# Patient Record
Sex: FEMALE | Race: WHITE | NOT HISPANIC OR LATINO | Employment: OTHER | URBAN - METROPOLITAN AREA
[De-identification: names, ages, dates, MRNs, and addresses within clinical notes are randomized per-mention and may not be internally consistent; named-entity substitution may affect disease eponyms.]

---

## 2022-10-06 ENCOUNTER — OFFICE VISIT (OUTPATIENT)
Dept: FAMILY MEDICINE | Facility: CLINIC | Age: 67
End: 2022-10-06
Payer: MEDICARE

## 2022-10-06 VITALS
HEART RATE: 87 BPM | DIASTOLIC BLOOD PRESSURE: 78 MMHG | BODY MASS INDEX: 18.18 KG/M2 | WEIGHT: 102.63 LBS | TEMPERATURE: 99 F | RESPIRATION RATE: 20 BRPM | HEIGHT: 63 IN | OXYGEN SATURATION: 97 % | SYSTOLIC BLOOD PRESSURE: 130 MMHG

## 2022-10-06 DIAGNOSIS — Z79.899 ON LONG TERM DRUG THERAPY: ICD-10-CM

## 2022-10-06 DIAGNOSIS — F17.200 TOBACCO DEPENDENCE: ICD-10-CM

## 2022-10-06 DIAGNOSIS — R60.9 EDEMA, UNSPECIFIED TYPE: ICD-10-CM

## 2022-10-06 DIAGNOSIS — J44.9 CHRONIC OBSTRUCTIVE PULMONARY DISEASE, UNSPECIFIED COPD TYPE: Primary | ICD-10-CM

## 2022-10-06 DIAGNOSIS — Z12.39 ENCOUNTER FOR SCREENING FOR MALIGNANT NEOPLASM OF BREAST, UNSPECIFIED SCREENING MODALITY: ICD-10-CM

## 2022-10-06 DIAGNOSIS — R06.02 SOB (SHORTNESS OF BREATH): ICD-10-CM

## 2022-10-06 DIAGNOSIS — I73.9 CLAUDICATION: ICD-10-CM

## 2022-10-06 DIAGNOSIS — Z87.891 PERSONAL HISTORY OF NICOTINE DEPENDENCE: ICD-10-CM

## 2022-10-06 DIAGNOSIS — I10 HYPERTENSION, UNSPECIFIED TYPE: ICD-10-CM

## 2022-10-06 DIAGNOSIS — Z12.31 ENCOUNTER FOR SCREENING MAMMOGRAM FOR MALIGNANT NEOPLASM OF BREAST: ICD-10-CM

## 2022-10-06 DIAGNOSIS — D69.1 THROMBOCYTE DISORDER: ICD-10-CM

## 2022-10-06 DIAGNOSIS — Z12.11 SCREENING FOR MALIGNANT NEOPLASM OF COLON: ICD-10-CM

## 2022-10-06 DIAGNOSIS — I71.40 ABDOMINAL AORTIC ANEURYSM (AAA) WITHOUT RUPTURE, UNSPECIFIED PART: ICD-10-CM

## 2022-10-06 DIAGNOSIS — M47.816 LUMBAR ARTHROPATHY: ICD-10-CM

## 2022-10-06 DIAGNOSIS — M54.12 CERVICAL RADICULOPATHY: ICD-10-CM

## 2022-10-06 LAB
ALBUMIN SERPL BCP-MCNC: 3.7 G/DL (ref 3.4–5)
ALBUMIN/GLOBULIN RATIO: 1.16 RATIO (ref 1.1–1.8)
ALP SERPL-CCNC: 105 U/L (ref 46–116)
ALT SERPL W P-5'-P-CCNC: 31 U/L (ref 12–78)
ANION GAP SERPL CALC-SCNC: 6 MMOL/L (ref 3–11)
AST SERPL-CCNC: 26 U/L (ref 15–37)
BANDS: 2 % (ref 0–5)
BILIRUB SERPL-MCNC: 0.2 MG/DL (ref 0–1)
BNP SERPL-MCNC: 82 PG/ML (ref 0–100)
BUN SERPL-MCNC: 18 MG/DL (ref 7–18)
BUN/CREAT SERPL: 22.78 RATIO (ref 7–18)
CALCIUM SERPL-MCNC: 8.4 MG/DL (ref 8.8–10.5)
CELLS COUNTED: 100
CHLORIDE SERPL-SCNC: 104 MMOL/L (ref 100–108)
CHOLEST SERPL-MSCNC: 194 MG/DL
CO2 SERPL-SCNC: 33 MMOL/L (ref 21–32)
CREAT SERPL-MCNC: 0.79 MG/DL (ref 0.55–1.02)
EOSINOPHIL NFR BLD: 5 % (ref 1–3)
ERYTHROCYTE [DISTWIDTH] IN BLOOD BY AUTOMATED COUNT: 17 % (ref 12.5–18)
GFR ESTIMATION: > 60
GLOBULIN: 3.2 G/DL (ref 2.3–3.5)
GLUCOSE SERPL-MCNC: 87 MG/DL (ref 70–110)
HBA1C MFR BLD: 5.2 % (ref 4.5–6.2)
HCT VFR BLD AUTO: 40.2 % (ref 37–47)
HDL/CHOLESTEROL RATIO: 4.1 RATIO
HDLC SERPL-MCNC: 47 MG/DL (ref 39–96)
HGB BLD-MCNC: 13.3 G/DL (ref 12–16)
LDLC SERPL CALC-MCNC: 76.6 MG/DL
LYMPHOCYTES NFR BLD: 14 % (ref 25–40)
MCH RBC QN AUTO: 33.1 PG (ref 27–31.2)
MCHC RBC AUTO-ENTMCNC: 33.1 G/DL (ref 31.8–35.4)
MCV RBC AUTO: 100 FL (ref 80–97)
MONOCYTES NFR BLD: 5 % (ref 1–15)
NEUTROPHILS # BLD AUTO: 7.5 10*3/UL (ref 1.8–7.7)
NEUTROPHILS NFR BLD: 74 % (ref 37–80)
NUCLEATED RED BLOOD CELLS: 0 %
PLATELETS: 327 10*3/UL (ref 142–424)
POTASSIUM SERPL-SCNC: 4.5 MMOL/L (ref 3.6–5.2)
PROT SERPL-MCNC: 6.9 G/DL (ref 6.4–8.2)
RBC # BLD AUTO: 4.02 10*6/UL (ref 4.2–5.4)
RBC MORPH BLD: ABNORMAL
SMALL PLATELETS BLD QL SMEAR: ADEQUATE
SODIUM BLD-SCNC: 143 MMOL/L (ref 135–145)
T4 FREE SP9 P CHAL SERPL-SCNC: 0.86 NG/DL (ref 0.76–1.46)
TRIGL SERPL-MCNC: 352 MG/DL (ref 30–200)
TSH SERPL DL<=0.005 MIU/L-ACNC: 0.87 UIU/ML (ref 0.36–3.74)
VLDL CHOLESTEROL: 70 MG/DL (ref 0–40)
WBC # BLD: 9.9 10*3/UL (ref 4.6–10.2)

## 2022-10-06 PROCEDURE — 99204 PR OFFICE/OUTPT VISIT, NEW, LEVL IV, 45-59 MIN: ICD-10-PCS | Mod: S$GLB,,, | Performed by: FAMILY MEDICINE

## 2022-10-06 PROCEDURE — 99204 OFFICE O/P NEW MOD 45 MIN: CPT | Mod: S$GLB,,, | Performed by: FAMILY MEDICINE

## 2022-10-06 RX ORDER — HYDROCHLOROTHIAZIDE 25 MG/1
25 TABLET ORAL DAILY
Qty: 30 TABLET | Refills: 11 | OUTPATIENT
Start: 2022-10-06 | End: 2022-10-06

## 2022-10-06 RX ORDER — HYDROCODONE BITARTRATE AND ACETAMINOPHEN 7.5; 325 MG/1; MG/1
1 TABLET ORAL EVERY 8 HOURS PRN
Qty: 20 TABLET | Refills: 0 | Status: SHIPPED | OUTPATIENT
Start: 2022-10-06

## 2022-10-06 RX ORDER — HYDROCHLOROTHIAZIDE 25 MG/1
25 TABLET ORAL DAILY
Qty: 30 TABLET | Refills: 11 | Status: SHIPPED | OUTPATIENT
Start: 2022-10-06 | End: 2022-11-07 | Stop reason: SDUPTHER

## 2022-10-06 RX ORDER — ALBUTEROL SULFATE 90 UG/1
2 AEROSOL, METERED RESPIRATORY (INHALATION) EVERY 6 HOURS PRN
Qty: 18 G | Refills: 2 | Status: SHIPPED | OUTPATIENT
Start: 2022-10-06 | End: 2023-01-18 | Stop reason: SDUPTHER

## 2022-10-06 RX ORDER — POTASSIUM CHLORIDE 750 MG/1
10 TABLET, EXTENDED RELEASE ORAL DAILY
Qty: 30 TABLET | Refills: 1 | Status: SHIPPED | OUTPATIENT
Start: 2022-10-06

## 2022-10-06 NOTE — PROGRESS NOTES
Subjective:      Patient ID: Daniela Ramires is a 67 y.o. female.    Chief Complaint: Establish Care and Leg Swelling (Pt. Reports she has large amount of swelling primarily in lower legs)      HPI:  67-year-old female who presents for initiation of care.  She does complain of some lower extremity swelling. she reports this began a couple months ago.  She does get pain in her lower extremities when she ambulates.  She attributes this to her back pain.  She has had multiple surgeries.  She would like some pain pills and so she can get her surgery.  She does take gabapentin.  She takes amlodipine for her blood pressure.  She does report she was told she had a AAA in the past.  Was noted on a routine CT scan.  She did have some platelets disorders in the past.  She thinks this resolved.  He had lost some weight for little while but she is gaining weight back.  She still smokes 1 pack per day.  She is due for mammogram.  She has never had a colonoscopy.    Past Medical History:   Diagnosis Date    COPD (chronic obstructive pulmonary disease)     Hypertension      Past Surgical History:   Procedure Laterality Date    BACK SURGERY       SECTION      HYSTERECTOMY      NECK SURGERY       History reviewed. No pertinent family history.  Social History     Socioeconomic History    Marital status:    Tobacco Use    Smoking status: Every Day     Packs/day: 1.00     Types: Cigarettes     Passive exposure: Past    Smokeless tobacco: Never   Substance and Sexual Activity    Alcohol use: Never     Review of patient's allergies indicates:  No Known Allergies    Review of Systems   Constitutional:  Negative for activity change, appetite change, chills, fatigue and fever.   HENT:  Negative for congestion, ear pain, postnasal drip, rhinorrhea, sinus pressure, sinus pain and sore throat.    Eyes:  Negative for pain and redness.   Respiratory:  Positive for shortness of breath. Negative for cough and chest tightness.   "  Cardiovascular:  Positive for leg swelling. Negative for chest pain.   Gastrointestinal:  Negative for abdominal distention, abdominal pain, constipation, diarrhea, nausea and vomiting.   Endocrine: Negative for cold intolerance and heat intolerance.   Genitourinary:  Negative for dysuria, frequency and hematuria.   Musculoskeletal:  Positive for arthralgias, back pain and neck pain. Negative for joint swelling.   Skin:  Negative for pallor.   Neurological:  Negative for dizziness and light-headedness.   Psychiatric/Behavioral:  Negative for agitation, decreased concentration and hallucinations. The patient is not nervous/anxious.      Objective:       /78   Pulse 87   Temp 98.7 °F (37.1 °C) (Oral)   Resp 20   Ht 5' 3" (1.6 m)   Wt 46.5 kg (102 lb 9.6 oz)   SpO2 97%   BMI 18.17 kg/m²   Physical Exam  Vitals and nursing note reviewed.   Constitutional:       Appearance: She is well-developed.   HENT:      Head: Normocephalic and atraumatic.      Nose: Nose normal.   Eyes:      Conjunctiva/sclera: Conjunctivae normal.      Pupils: Pupils are equal, round, and reactive to light.   Cardiovascular:      Rate and Rhythm: Normal rate and regular rhythm.      Heart sounds: Normal heart sounds.   Pulmonary:      Effort: Pulmonary effort is normal.      Breath sounds: Normal breath sounds.   Abdominal:      Palpations: Abdomen is soft.      Tenderness: There is no abdominal tenderness.   Musculoskeletal:         General: Normal range of motion.      Cervical back: Normal range of motion and neck supple.      Right lower leg: Edema present.      Left lower leg: Edema present.   Skin:     General: Skin is warm and dry.   Neurological:      Mental Status: She is alert and oriented to person, place, and time.   Psychiatric:         Behavior: Behavior normal.         Thought Content: Thought content normal.         Judgment: Judgment normal.       Assessment:     1. Chronic obstructive pulmonary disease, unspecified " COPD type    2. SOB (shortness of breath)    3. Hypertension, unspecified type    4. Edema, unspecified type    5. On long term drug therapy    6. Lumbar arthropathy    7. Cervical radiculopathy    8. Encounter for screening for malignant neoplasm of breast, unspecified screening modality    9. Tobacco dependence    10. Screening for malignant neoplasm of colon    11. Claudication    12. Abdominal aortic aneurysm (AAA) without rupture, unspecified part    13. Personal history of nicotine dependence    14. Encounter for screening mammogram for malignant neoplasm of breast    15. Thrombocyte disorder        Plan:   Chronic obstructive pulmonary disease, unspecified COPD type  -     albuterol (VENTOLIN HFA) 90 mcg/actuation inhaler; Inhale 2 puffs into the lungs every 6 (six) hours as needed for Wheezing. Rescue  Dispense: 18 g; Refill: 2    SOB (shortness of breath)  -     X-Ray Chest PA And Lateral; Future; Expected date: 10/06/2022    Hypertension, unspecified type  -     Discontinue: hydroCHLOROthiazide (HYDRODIURIL) 25 MG tablet; Take 1 tablet (25 mg total) by mouth once daily.  Dispense: 30 tablet; Refill: 11  -     hydroCHLOROthiazide (HYDRODIURIL) 25 MG tablet; Take 1 tablet (25 mg total) by mouth once daily.  Dispense: 30 tablet; Refill: 11  -     potassium chloride (KLOR-CON) 10 MEQ TbSR; Take 1 tablet (10 mEq total) by mouth once daily.  Dispense: 30 tablet; Refill: 1    Edema, unspecified type  -     Brain natriuretic peptide; Future; Expected date: 10/06/2022    On long term drug therapy  -     Hemoglobin A1C; Future; Expected date: 10/06/2022  -     CBC Auto Differential; Future; Expected date: 10/06/2022  -     TSH; Future; Expected date: 10/06/2022  -     T4, Free; Future; Expected date: 10/06/2022  -     Comprehensive Metabolic Panel; Future; Expected date: 10/06/2022  -     Lipid Panel; Future; Expected date: 10/06/2022    Lumbar arthropathy  -     HYDROcodone-acetaminophen (NORCO) 7.5-325 mg per  tablet; Take 1 tablet by mouth every 8 (eight) hours as needed for Pain.  Dispense: 20 tablet; Refill: 0    Cervical radiculopathy    Encounter for screening for malignant neoplasm of breast, unspecified screening modality  -     Mammo Digital Screening Bilat; Future; Expected date: 10/06/2022    Tobacco dependence  -     CT Chest Lung Screening Low Dose; Future; Expected date: 10/06/2022    Screening for malignant neoplasm of colon  -     Ambulatory referral/consult to General Surgery; Future; Expected date: 10/13/2022    Claudication  -     US Lower Extrem Arteries Bilat with KIERRA (xpd); Future; Expected date: 10/06/2022    Abdominal aortic aneurysm (AAA) without rupture, unspecified part    Personal history of nicotine dependence  -     CT Chest Lung Screening Low Dose; Future; Expected date: 10/06/2022    Encounter for screening mammogram for malignant neoplasm of breast  -     Mammo Digital Screening Bilat; Future; Expected date: 10/06/2022    Thrombocyte disorder  -     CBC Auto Differential; Future; Expected date: 10/06/2022      Labs pending.      Order CT lung.      Sample trelegy provided.  One puff daily.  Let me know next week or the week after feel like it.  Refill Ventolin.      Smoking cessation encouraged.      Order mammogram.      Order KIERRA.      Refer for colonoscopy.      Request old records.      Will likely need a repeat ultrasound for her AA A.      Follow-up in 1 month.  Sooner if needed.      Gabapentin may be the cause of her lower extremity swelling.  Will continue for now.      Stop amlodipine.  This could be the cause of her lower extremity swelling.      Start hydrochlorothiazide.  This will assist with her blood pressures well as her lower extremity swelling.          Medication List with Changes/Refills   New Medications    ALBUTEROL (VENTOLIN HFA) 90 MCG/ACTUATION INHALER    Inhale 2 puffs into the lungs every 6 (six) hours as needed for Wheezing. Rescue    HYDROCHLOROTHIAZIDE  (HYDRODIURIL) 25 MG TABLET    Take 1 tablet (25 mg total) by mouth once daily.    HYDROCODONE-ACETAMINOPHEN (NORCO) 7.5-325 MG PER TABLET    Take 1 tablet by mouth every 8 (eight) hours as needed for Pain.    POTASSIUM CHLORIDE (KLOR-CON) 10 MEQ TBSR    Take 1 tablet (10 mEq total) by mouth once daily.            Disclaimer: This note may have been prepared using voice recognition software, it may have not been extensively proofed, as such there could be errors within the text such as sound alike errors.

## 2022-10-07 ENCOUNTER — PATIENT MESSAGE (OUTPATIENT)
Dept: ADMINISTRATIVE | Facility: HOSPITAL | Age: 67
End: 2022-10-07
Payer: MEDICARE

## 2022-10-12 DIAGNOSIS — F17.200 TOBACCO DEPENDENCE: Primary | ICD-10-CM

## 2022-10-12 DIAGNOSIS — F17.210 CIGARETTE SMOKER: ICD-10-CM

## 2022-10-17 DIAGNOSIS — M47.816 LUMBAR ARTHROPATHY: ICD-10-CM

## 2022-10-17 RX ORDER — HYDROCODONE BITARTRATE AND ACETAMINOPHEN 7.5; 325 MG/1; MG/1
1 TABLET ORAL EVERY 8 HOURS PRN
Qty: 20 TABLET | Refills: 0 | OUTPATIENT
Start: 2022-10-17

## 2022-10-17 NOTE — TELEPHONE ENCOUNTER
----- Message from Rika Macias sent at 10/17/2022  1:48 PM CDT -----  Contact: pt  Pt calling for refill on HYDROcodone-acetaminophen (NORCO) 7.5-325 mg Pt can be reached at [phone]       [facility] [address]  Phone: [phone] Fax: [phone]    Thanks,

## 2022-11-07 ENCOUNTER — OFFICE VISIT (OUTPATIENT)
Dept: FAMILY MEDICINE | Facility: CLINIC | Age: 67
End: 2022-11-07
Payer: MEDICARE

## 2022-11-07 VITALS
BODY MASS INDEX: 18.18 KG/M2 | HEART RATE: 104 BPM | WEIGHT: 102.63 LBS | OXYGEN SATURATION: 93 % | DIASTOLIC BLOOD PRESSURE: 68 MMHG | SYSTOLIC BLOOD PRESSURE: 120 MMHG | HEIGHT: 63 IN | RESPIRATION RATE: 18 BRPM

## 2022-11-07 DIAGNOSIS — F41.9 ANXIETY: ICD-10-CM

## 2022-11-07 DIAGNOSIS — F17.200 TOBACCO DEPENDENCE: ICD-10-CM

## 2022-11-07 DIAGNOSIS — M47.816 LUMBAR ARTHROPATHY: ICD-10-CM

## 2022-11-07 DIAGNOSIS — I10 HYPERTENSION, UNSPECIFIED TYPE: ICD-10-CM

## 2022-11-07 DIAGNOSIS — J44.9 CHRONIC OBSTRUCTIVE PULMONARY DISEASE, UNSPECIFIED COPD TYPE: Primary | ICD-10-CM

## 2022-11-07 DIAGNOSIS — Z90.81 HISTORY OF SPLENECTOMY: ICD-10-CM

## 2022-11-07 PROCEDURE — 99214 PR OFFICE/OUTPT VISIT, EST, LEVL IV, 30-39 MIN: ICD-10-PCS | Mod: S$GLB,,, | Performed by: FAMILY MEDICINE

## 2022-11-07 PROCEDURE — 99214 OFFICE O/P EST MOD 30 MIN: CPT | Mod: S$GLB,,, | Performed by: FAMILY MEDICINE

## 2022-11-07 RX ORDER — HYDROCHLOROTHIAZIDE 25 MG/1
25 TABLET ORAL DAILY
Qty: 30 TABLET | Refills: 3 | Status: SHIPPED | OUTPATIENT
Start: 2022-11-07 | End: 2023-06-12 | Stop reason: SDUPTHER

## 2022-11-07 RX ORDER — ALPRAZOLAM 0.25 MG/1
0.25 TABLET ORAL DAILY PRN
Qty: 30 TABLET | Refills: 3 | Status: SHIPPED | OUTPATIENT
Start: 2022-11-07 | End: 2023-03-10

## 2022-11-07 RX ORDER — BUPROPION HYDROCHLORIDE 150 MG/1
150 TABLET ORAL DAILY
Qty: 30 TABLET | Refills: 11 | Status: SHIPPED | OUTPATIENT
Start: 2022-11-07 | End: 2023-07-10 | Stop reason: SDUPTHER

## 2022-11-07 NOTE — PROGRESS NOTES
Subjective:      Patient ID: Daniela Ramires is a 67 y.o. female.    Chief Complaint: Follow-up and Anxiety (Trying to stop smoking but anxiety is up, has taken wellbutrin in the past and it worked good)      HPI:  67-year-old female who presents for chronic medical management.  Swelling better since stopping amlodipine and starting hydrochlorothiazide.  She is nervous about her upcoming back surgery.  She is trying to quit smoking.  She took Wellbutrin in the past and help.  She is napping on her children and grandchildren.  She took Xanax in the past and help with the irritability associated with smoking cessation.  She was happy with the Trelegy.  Did seem to help with her shortness of breath.  Was requiring less ventolin    Past Medical History:   Diagnosis Date    COPD (chronic obstructive pulmonary disease)     Hypertension      Past Surgical History:   Procedure Laterality Date    BACK SURGERY       SECTION      HYSTERECTOMY      NECK SURGERY       History reviewed. No pertinent family history.  Social History     Socioeconomic History    Marital status:    Tobacco Use    Smoking status: Every Day     Packs/day: 1.00     Types: Cigarettes     Passive exposure: Past    Smokeless tobacco: Never   Substance and Sexual Activity    Alcohol use: Never     Review of patient's allergies indicates:  No Known Allergies    Review of Systems   Constitutional:  Negative for activity change, appetite change, chills, fatigue and fever.   HENT:  Negative for congestion, ear pain, postnasal drip, rhinorrhea, sinus pressure, sinus pain and sore throat.    Eyes:  Negative for pain and redness.   Respiratory:  Negative for cough, chest tightness and shortness of breath.    Cardiovascular:  Negative for chest pain and leg swelling.   Gastrointestinal:  Negative for abdominal distention, abdominal pain, constipation, diarrhea, nausea and vomiting.   Endocrine: Negative for cold intolerance and heat intolerance.  "  Genitourinary:  Negative for dysuria, frequency and hematuria.   Musculoskeletal:  Positive for back pain. Negative for arthralgias and joint swelling.   Skin:  Negative for pallor.   Neurological:  Negative for dizziness and light-headedness.   Psychiatric/Behavioral:  Negative for agitation, decreased concentration and hallucinations. The patient is nervous/anxious.      Objective:       /68 (BP Location: Left arm, Patient Position: Sitting, BP Method: Medium (Manual))   Pulse 104   Resp 18   Ht 5' 3" (1.6 m)   Wt 46.5 kg (102 lb 9.6 oz)   SpO2 (!) 93%   BMI 18.17 kg/m²   Physical Exam  Constitutional:       Appearance: She is well-developed.   HENT:      Head: Normocephalic and atraumatic.      Nose: Nose normal.   Eyes:      Conjunctiva/sclera: Conjunctivae normal.      Pupils: Pupils are equal, round, and reactive to light.   Cardiovascular:      Rate and Rhythm: Normal rate and regular rhythm.      Heart sounds: Normal heart sounds.   Pulmonary:      Effort: Pulmonary effort is normal.      Breath sounds: Normal breath sounds.   Abdominal:      Palpations: Abdomen is soft.   Musculoskeletal:         General: Normal range of motion.      Cervical back: Normal range of motion and neck supple.   Skin:     General: Skin is warm and dry.   Neurological:      Mental Status: She is alert and oriented to person, place, and time.   Psychiatric:         Behavior: Behavior normal.         Thought Content: Thought content normal.       Assessment:     1. Chronic obstructive pulmonary disease, unspecified COPD type    2. Tobacco dependence    3. Anxiety    4. Hypertension, unspecified type    5. History of splenectomy    6. Lumbar arthropathy        Plan:   Chronic obstructive pulmonary disease, unspecified COPD type  -     fluticasone-umeclidin-vilanter (TRELEGY ELLIPTA) 100-62.5-25 mcg DsDv; Inhale 1 puff into the lungs once daily.  Dispense: 60 each; Refill: 3    Tobacco dependence  -     buPROPion " (WELLBUTRIN XL) 150 MG TB24 tablet; Take 1 tablet (150 mg total) by mouth once daily.  Dispense: 30 tablet; Refill: 11    Anxiety  -     ALPRAZolam (XANAX) 0.25 MG tablet; Take 1 tablet (0.25 mg total) by mouth daily as needed for Anxiety.  Dispense: 30 tablet; Refill: 3    Hypertension, unspecified type  -     hydroCHLOROthiazide (HYDRODIURIL) 25 MG tablet; Take 1 tablet (25 mg total) by mouth once daily.  Dispense: 30 tablet; Refill: 3    History of splenectomy    Lumbar arthropathy      Sample Trelegy provided.  Continue.      Trial of Wellbutrin.      [place]  reviewed.  Trial of low-dose Xanax.      Continue hydrochlorothiazide.      Follow-up in 3 months.  Sooner if needed    Medication List with Changes/Refills   New Medications    ALPRAZOLAM (XANAX) 0.25 MG TABLET    Take 1 tablet (0.25 mg total) by mouth daily as needed for Anxiety.    BUPROPION (WELLBUTRIN XL) 150 MG TB24 TABLET    Take 1 tablet (150 mg total) by mouth once daily.    FLUTICASONE-UMECLIDIN-VILANTER (TRELEGY ELLIPTA) 100-62.5-25 MCG DSDV    Inhale 1 puff into the lungs once daily.   Current Medications    ALBUTEROL (VENTOLIN HFA) 90 MCG/ACTUATION INHALER    Inhale 2 puffs into the lungs every 6 (six) hours as needed for Wheezing. Rescue    HYDROCODONE-ACETAMINOPHEN (NORCO) 7.5-325 MG PER TABLET    Take 1 tablet by mouth every 8 (eight) hours as needed for Pain.    POTASSIUM CHLORIDE (KLOR-CON) 10 MEQ TBSR    Take 1 tablet (10 mEq total) by mouth once daily.   Changed and/or Refilled Medications    Modified Medication Previous Medication    HYDROCHLOROTHIAZIDE (HYDRODIURIL) 25 MG TABLET hydroCHLOROthiazide (HYDRODIURIL) 25 MG tablet       Take 1 tablet (25 mg total) by mouth once daily.    Take 1 tablet (25 mg total) by mouth once daily.            Disclaimer: This note may have been prepared using voice recognition software, it may have not been extensively proofed, as such there could be errors within the text such as sound alike errors.

## 2022-11-16 ENCOUNTER — PATIENT MESSAGE (OUTPATIENT)
Dept: ADMINISTRATIVE | Facility: HOSPITAL | Age: 67
End: 2022-11-16
Payer: MEDICARE

## 2022-12-22 ENCOUNTER — OFFICE VISIT (OUTPATIENT)
Dept: FAMILY MEDICINE | Facility: CLINIC | Age: 67
End: 2022-12-22
Payer: MEDICARE

## 2022-12-22 VITALS
HEIGHT: 63 IN | BODY MASS INDEX: 19.2 KG/M2 | OXYGEN SATURATION: 96 % | DIASTOLIC BLOOD PRESSURE: 70 MMHG | TEMPERATURE: 98 F | SYSTOLIC BLOOD PRESSURE: 110 MMHG | RESPIRATION RATE: 20 BRPM | WEIGHT: 108.38 LBS | HEART RATE: 83 BPM

## 2022-12-22 DIAGNOSIS — M47.816 LUMBAR ARTHROPATHY: ICD-10-CM

## 2022-12-22 DIAGNOSIS — J44.9 CHRONIC OBSTRUCTIVE PULMONARY DISEASE, UNSPECIFIED COPD TYPE: Primary | ICD-10-CM

## 2022-12-22 DIAGNOSIS — I10 HYPERTENSION, UNSPECIFIED TYPE: ICD-10-CM

## 2022-12-22 DIAGNOSIS — M54.12 CERVICAL RADICULOPATHY: ICD-10-CM

## 2022-12-22 DIAGNOSIS — D69.1 THROMBOCYTE DISORDER: ICD-10-CM

## 2022-12-22 PROCEDURE — 3008F PR BODY MASS INDEX (BMI) DOCUMENTED: ICD-10-PCS | Mod: CPTII,S$GLB,, | Performed by: FAMILY MEDICINE

## 2022-12-22 PROCEDURE — 99214 OFFICE O/P EST MOD 30 MIN: CPT | Mod: S$GLB,,, | Performed by: FAMILY MEDICINE

## 2022-12-22 PROCEDURE — 1159F MED LIST DOCD IN RCRD: CPT | Mod: CPTII,S$GLB,, | Performed by: FAMILY MEDICINE

## 2022-12-22 PROCEDURE — 3044F HG A1C LEVEL LT 7.0%: CPT | Mod: CPTII,S$GLB,, | Performed by: FAMILY MEDICINE

## 2022-12-22 PROCEDURE — 99214 PR OFFICE/OUTPT VISIT, EST, LEVL IV, 30-39 MIN: ICD-10-PCS | Mod: S$GLB,,, | Performed by: FAMILY MEDICINE

## 2022-12-22 PROCEDURE — 3078F PR MOST RECENT DIASTOLIC BLOOD PRESSURE < 80 MM HG: ICD-10-PCS | Mod: CPTII,S$GLB,, | Performed by: FAMILY MEDICINE

## 2022-12-22 PROCEDURE — 3044F PR MOST RECENT HEMOGLOBIN A1C LEVEL <7.0%: ICD-10-PCS | Mod: CPTII,S$GLB,, | Performed by: FAMILY MEDICINE

## 2022-12-22 PROCEDURE — 3078F DIAST BP <80 MM HG: CPT | Mod: CPTII,S$GLB,, | Performed by: FAMILY MEDICINE

## 2022-12-22 PROCEDURE — 3074F PR MOST RECENT SYSTOLIC BLOOD PRESSURE < 130 MM HG: ICD-10-PCS | Mod: CPTII,S$GLB,, | Performed by: FAMILY MEDICINE

## 2022-12-22 PROCEDURE — 1159F PR MEDICATION LIST DOCUMENTED IN MEDICAL RECORD: ICD-10-PCS | Mod: CPTII,S$GLB,, | Performed by: FAMILY MEDICINE

## 2022-12-22 PROCEDURE — 3008F BODY MASS INDEX DOCD: CPT | Mod: CPTII,S$GLB,, | Performed by: FAMILY MEDICINE

## 2022-12-22 PROCEDURE — 3074F SYST BP LT 130 MM HG: CPT | Mod: CPTII,S$GLB,, | Performed by: FAMILY MEDICINE

## 2022-12-22 NOTE — PROGRESS NOTES
Subjective:      Patient ID: Daniela Ramires is a 67 y.o. female.    Chief Complaint: surgical clearance      HPI:  67-year-old female who presents for surgical clearance.  Reports shortness of breath much better on Trelegy.  Lower extremity swelling resolved on HCTZ.  Reports she is to walk up 17 stairs with no shortness of breath.  Denies any chest pain.  No cardiac disease that she knows of.  She has cut back on her smoking.  She is ready to proceed with back surgery  Past Medical History:   Diagnosis Date    COPD (chronic obstructive pulmonary disease)     Hypertension      Past Surgical History:   Procedure Laterality Date    BACK SURGERY       SECTION      HYSTERECTOMY      NECK SURGERY       No family history on file.  Social History     Socioeconomic History    Marital status:    Tobacco Use    Smoking status: Every Day     Packs/day: 1.00     Types: Cigarettes     Passive exposure: Past    Smokeless tobacco: Never   Substance and Sexual Activity    Alcohol use: Never     Review of patient's allergies indicates:  No Known Allergies    Review of Systems   Constitutional:  Negative for activity change, appetite change, chills, fatigue and fever.   HENT:  Negative for congestion, ear pain, postnasal drip, rhinorrhea, sinus pressure, sinus pain and sore throat.    Eyes:  Negative for pain and redness.   Respiratory:  Negative for cough, chest tightness and shortness of breath.    Cardiovascular:  Negative for chest pain and leg swelling.   Gastrointestinal:  Negative for abdominal distention, abdominal pain, constipation, diarrhea, nausea and vomiting.   Endocrine: Negative for cold intolerance and heat intolerance.   Genitourinary:  Negative for dysuria, frequency and hematuria.   Musculoskeletal:  Positive for back pain. Negative for arthralgias and joint swelling.   Skin:  Negative for pallor.   Neurological:  Negative for dizziness and light-headedness.   Psychiatric/Behavioral:  Negative  "for agitation, decreased concentration and hallucinations. The patient is not nervous/anxious.      Objective:       /70   Pulse 83   Temp 98 °F (36.7 °C) (Oral)   Resp 20   Ht 5' 3" (1.6 m)   Wt 49.2 kg (108 lb 6.4 oz)   SpO2 96%   BMI 19.20 kg/m²   Physical Exam  Constitutional:       Appearance: She is well-developed.   HENT:      Head: Normocephalic and atraumatic.      Nose: Nose normal.   Eyes:      Conjunctiva/sclera: Conjunctivae normal.      Pupils: Pupils are equal, round, and reactive to light.   Cardiovascular:      Rate and Rhythm: Normal rate and regular rhythm.      Heart sounds: Normal heart sounds.   Pulmonary:      Effort: Pulmonary effort is normal.      Breath sounds: Normal breath sounds.   Abdominal:      Palpations: Abdomen is soft.   Musculoskeletal:         General: Normal range of motion.      Cervical back: Normal range of motion and neck supple.      Right lower leg: No edema.      Left lower leg: No edema.   Skin:     General: Skin is warm and dry.   Neurological:      Mental Status: She is alert and oriented to person, place, and time.   Psychiatric:         Behavior: Behavior normal.         Thought Content: Thought content normal.       Assessment:     1. Chronic obstructive pulmonary disease, unspecified COPD type    2. Thrombocyte disorder    3. Hypertension, unspecified type    4. Lumbar arthropathy    5. Cervical radiculopathy        Plan:   Chronic obstructive pulmonary disease, unspecified COPD type  -     X-Ray Chest PA And Lateral; Future; Expected date: 12/22/2022    Thrombocyte disorder  -     CBC Auto Differential; Future; Expected date: 12/22/2022  -     APTT; Future; Expected date: 12/22/2022  -     Protime-INR; Future; Expected date: 12/22/2022    Hypertension, unspecified type  -     Comprehensive Metabolic Panel; Future; Expected date: 12/22/2022  -     Urinalysis; Future; Expected date: 12/22/2022    Lumbar arthropathy    Cervical radiculopathy      I " have independently reviewed imaging ordered and obtained today. Findings:  COPD, no mass, no infiltrate.    EKG obtained and reviewed with the following findings:  Sinus arrhythmia, PA-C, normal axis, TWI lateral leads.      Low risk surgical intervention pending normal laboratory findings.      Denies chest pain.  Denies shortness of breath.  Cutting back on smoking.    Plan to repeat CT scan in 3-6 months.  Patient to follow-up after surgery        Medication List with Changes/Refills   Current Medications    ALBUTEROL (VENTOLIN HFA) 90 MCG/ACTUATION INHALER    Inhale 2 puffs into the lungs every 6 (six) hours as needed for Wheezing. Rescue    ALPRAZOLAM (XANAX) 0.25 MG TABLET    Take 1 tablet (0.25 mg total) by mouth daily as needed for Anxiety.    BUPROPION (WELLBUTRIN XL) 150 MG TB24 TABLET    Take 1 tablet (150 mg total) by mouth once daily.    FLUTICASONE-UMECLIDIN-VILANTER (TRELEGY ELLIPTA) 100-62.5-25 MCG DSDV    Inhale 1 puff into the lungs once daily.    HYDROCHLOROTHIAZIDE (HYDRODIURIL) 25 MG TABLET    Take 1 tablet (25 mg total) by mouth once daily.    HYDROCODONE-ACETAMINOPHEN (NORCO) 7.5-325 MG PER TABLET    Take 1 tablet by mouth every 8 (eight) hours as needed for Pain.    POTASSIUM CHLORIDE (KLOR-CON) 10 MEQ TBSR    Take 1 tablet (10 mEq total) by mouth once daily.            Disclaimer: This note may have been prepared using voice recognition software, it may have not been extensively proofed, as such there could be errors within the text such as sound alike errors.

## 2022-12-23 DIAGNOSIS — N30.90 CYSTITIS: Primary | ICD-10-CM

## 2022-12-23 LAB
ABS NRBC COUNT: 0 X 10 3/UL (ref 0–0.01)
ABSOLUTE BASOPHIL: 0.13 X 10 3/UL (ref 0–0.22)
ABSOLUTE EOSINOPHIL: 0.33 X 10 3/UL (ref 0.04–0.54)
ABSOLUTE IMMATURE GRAN: 0.06 X 10 3/UL (ref 0–0.04)
ABSOLUTE LYMPHOCYTE: 2.14 X 10 3/UL (ref 0.86–4.75)
ABSOLUTE MONOCYTE: 1.24 X 10 3/UL (ref 0.22–1.08)
ALBUMIN SERPL-MCNC: 4.6 G/DL (ref 3.5–5.2)
ALBUMIN/GLOB SERPL ELPH: 1.6 {RATIO} (ref 1–2.7)
ALP ISOS SERPL LEV INH-CCNC: 102 U/L (ref 35–105)
ALT (SGPT): 11 U/L (ref 0–33)
AMORPH URATE CRY URNS QL MICRO: NEGATIVE
ANION GAP SERPL CALC-SCNC: 14 MMOL/L (ref 8–17)
AST SERPL-CCNC: 17 U/L (ref 0–32)
BACTERIA #/AREA URNS HPF: ABNORMAL /[HPF]
BASOPHILS NFR BLD: 1 % (ref 0.2–1.2)
BILIRUB UR QL STRIP: NEGATIVE
BILIRUBIN, TOTAL: 0.33 MG/DL (ref 0–1.2)
BUN/CREAT SERPL: 18.7 (ref 6–20)
CALCIUM SERPL-MCNC: 9.3 MG/DL (ref 8.6–10.2)
CARBON DIOXIDE, CO2: 28 MMOL/L (ref 22–29)
CHLORIDE: 91 MMOL/L (ref 98–107)
CLARITY UR: CLEAR
COLOR UR: YELLOW
CREAT SERPL-MCNC: 1.03 MG/DL (ref 0.5–0.9)
EOSINOPHIL NFR BLD: 2.5 % (ref 0.7–7)
EPITHELIAL CELLS: ABNORMAL
GFR ESTIMATION: 60.34
GLOBULIN: 2.8 G/DL (ref 1.5–4.5)
GLUCOSE (UA): NEGATIVE MG/DL
GLUCOSE: 109 MG/DL (ref 82–115)
HCT VFR BLD AUTO: 40.6 % (ref 37–47)
HGB BLD-MCNC: 14.3 G/DL (ref 12–16)
IMMATURE GRANULOCYTES: 0.5 % (ref 0–0.5)
INR PPP: 0.93 (ref 0.88–1.12)
KETONES UR QL STRIP: NEGATIVE MG/DL
LEUKOCYTE ESTERASE UR QL STRIP: ABNORMAL
LYMPHOCYTES NFR BLD: 16.5 % (ref 19.3–53.1)
MCH RBC QN AUTO: 34.2 PG (ref 27–32)
MCHC RBC AUTO-ENTMCNC: 35.2 G/DL (ref 32–36)
MCV RBC AUTO: 97.1 FL (ref 82–100)
MONOCYTES NFR BLD: 9.5 % (ref 4.7–12.5)
MUCOUS THREADS URNS QL MICRO: NEGATIVE
NEUTROPHILS # BLD AUTO: 9.1 X 10 3/UL (ref 2.15–7.56)
NEUTROPHILS NFR BLD: 70 % (ref 34–71.1)
NITRITE UR QL STRIP: POSITIVE
NUCLEATED RED BLOOD CELLS: 0 /100 WBC (ref 0–0.2)
OCCULT BLOOD: NEGATIVE
PH, URINE: 5 (ref 5–7.5)
PLATELET # BLD AUTO: 437 X 10 3/UL (ref 135–400)
POTASSIUM: 4 MMOL/L (ref 3.5–5.1)
PROT SNV-MCNC: 7.4 G/DL (ref 6.4–8.3)
PROT UR QL STRIP: NEGATIVE MG/DL
PROTIME: 10 SECONDS (ref 9.5–11.5)
PTT: <23.5 SECONDS (ref 20.8–27)
RBC # BLD AUTO: 4.18 X 10 6/UL (ref 4.2–5.4)
RBC/HPF: NEGATIVE
RDW-SD: 46.6 FL (ref 37–54)
SODIUM: 133 MMOL/L (ref 136–145)
SP GR UR STRIP: 1.02 (ref 1–1.03)
UREA NITROGEN (BUN): 19.3 MG/DL (ref 8–23)
UROBILINOGEN, URINE: NORMAL E.U./DL (ref 0–1)
WBC # BLD: 13 X 10 3/UL (ref 4.3–10.8)
WBC/HPF: ABNORMAL

## 2022-12-23 RX ORDER — CIPROFLOXACIN 500 MG/1
500 TABLET ORAL 2 TIMES DAILY
Qty: 6 TABLET | Refills: 0 | Status: SHIPPED | OUTPATIENT
Start: 2022-12-23

## 2022-12-27 ENCOUNTER — CLINICAL SUPPORT (OUTPATIENT)
Dept: FAMILY MEDICINE | Facility: CLINIC | Age: 67
End: 2022-12-27
Payer: MEDICARE

## 2022-12-27 DIAGNOSIS — N30.90 CYSTITIS: Primary | ICD-10-CM

## 2022-12-27 PROCEDURE — 81003 POCT URINALYSIS: ICD-10-PCS | Mod: QW,S$GLB,, | Performed by: FAMILY MEDICINE

## 2022-12-27 PROCEDURE — 81003 URINALYSIS AUTO W/O SCOPE: CPT | Mod: QW,S$GLB,, | Performed by: FAMILY MEDICINE

## 2022-12-28 LAB
BILIRUB SERPL-MCNC: NORMAL MG/DL
BLOOD, POC UA: NORMAL
GLUCOSE UR QL STRIP: NORMAL
KETONES UR QL STRIP: NORMAL
LEUKOCYTE ESTERASE URINE, POC: NORMAL
NITRITE, POC UA: NORMAL
PH, POC UA: 5.5
PROTEIN, POC: NORMAL
SPECIFIC GRAVITY, POC UA: 1.03
UROBILINOGEN, POC UA: 0.2

## 2023-01-18 ENCOUNTER — OFFICE VISIT (OUTPATIENT)
Dept: FAMILY MEDICINE | Facility: CLINIC | Age: 68
End: 2023-01-18
Payer: MEDICARE

## 2023-01-18 VITALS
HEART RATE: 88 BPM | WEIGHT: 107.38 LBS | SYSTOLIC BLOOD PRESSURE: 110 MMHG | TEMPERATURE: 99 F | DIASTOLIC BLOOD PRESSURE: 68 MMHG | OXYGEN SATURATION: 97 % | BODY MASS INDEX: 19.03 KG/M2 | HEIGHT: 63 IN | RESPIRATION RATE: 20 BRPM

## 2023-01-18 DIAGNOSIS — J44.9 CHRONIC OBSTRUCTIVE PULMONARY DISEASE, UNSPECIFIED COPD TYPE: ICD-10-CM

## 2023-01-18 DIAGNOSIS — R63.0 DECREASED APPETITE: ICD-10-CM

## 2023-01-18 DIAGNOSIS — D69.1 THROMBOCYTE DISORDER: ICD-10-CM

## 2023-01-18 DIAGNOSIS — G47.00 INSOMNIA, UNSPECIFIED TYPE: ICD-10-CM

## 2023-01-18 DIAGNOSIS — R06.09 DOE (DYSPNEA ON EXERTION): Primary | ICD-10-CM

## 2023-01-18 DIAGNOSIS — R01.1 MURMUR: ICD-10-CM

## 2023-01-18 PROCEDURE — 1159F PR MEDICATION LIST DOCUMENTED IN MEDICAL RECORD: ICD-10-PCS | Mod: CPTII,S$GLB,, | Performed by: FAMILY MEDICINE

## 2023-01-18 PROCEDURE — 99214 PR OFFICE/OUTPT VISIT, EST, LEVL IV, 30-39 MIN: ICD-10-PCS | Mod: S$GLB,,, | Performed by: FAMILY MEDICINE

## 2023-01-18 PROCEDURE — 1125F AMNT PAIN NOTED PAIN PRSNT: CPT | Mod: CPTII,S$GLB,, | Performed by: FAMILY MEDICINE

## 2023-01-18 PROCEDURE — 99214 OFFICE O/P EST MOD 30 MIN: CPT | Mod: S$GLB,,, | Performed by: FAMILY MEDICINE

## 2023-01-18 PROCEDURE — 1125F PR PAIN SEVERITY QUANTIFIED, PAIN PRESENT: ICD-10-PCS | Mod: CPTII,S$GLB,, | Performed by: FAMILY MEDICINE

## 2023-01-18 PROCEDURE — 3008F PR BODY MASS INDEX (BMI) DOCUMENTED: ICD-10-PCS | Mod: CPTII,S$GLB,, | Performed by: FAMILY MEDICINE

## 2023-01-18 PROCEDURE — 1159F MED LIST DOCD IN RCRD: CPT | Mod: CPTII,S$GLB,, | Performed by: FAMILY MEDICINE

## 2023-01-18 PROCEDURE — 3008F BODY MASS INDEX DOCD: CPT | Mod: CPTII,S$GLB,, | Performed by: FAMILY MEDICINE

## 2023-01-18 PROCEDURE — 3078F DIAST BP <80 MM HG: CPT | Mod: CPTII,S$GLB,, | Performed by: FAMILY MEDICINE

## 2023-01-18 PROCEDURE — 3074F SYST BP LT 130 MM HG: CPT | Mod: CPTII,S$GLB,, | Performed by: FAMILY MEDICINE

## 2023-01-18 PROCEDURE — 3074F PR MOST RECENT SYSTOLIC BLOOD PRESSURE < 130 MM HG: ICD-10-PCS | Mod: CPTII,S$GLB,, | Performed by: FAMILY MEDICINE

## 2023-01-18 PROCEDURE — 3078F PR MOST RECENT DIASTOLIC BLOOD PRESSURE < 80 MM HG: ICD-10-PCS | Mod: CPTII,S$GLB,, | Performed by: FAMILY MEDICINE

## 2023-01-18 RX ORDER — MIRTAZAPINE 15 MG/1
15 TABLET, FILM COATED ORAL NIGHTLY
Qty: 30 TABLET | Refills: 1 | Status: SHIPPED | OUTPATIENT
Start: 2023-01-18 | End: 2023-02-08 | Stop reason: SDUPTHER

## 2023-01-18 RX ORDER — ALBUTEROL SULFATE 90 UG/1
2 AEROSOL, METERED RESPIRATORY (INHALATION) EVERY 6 HOURS PRN
Qty: 18 G | Refills: 2 | Status: SHIPPED | OUTPATIENT
Start: 2023-01-18 | End: 2023-10-02 | Stop reason: SDUPTHER

## 2023-01-18 NOTE — PROGRESS NOTES
Subjective:      Patient ID: Daniela Ramires is a 67 y.o. female.    Chief Complaint: Follow-up (Pt. Is recovering from back surgery, pt. Still having pain in lower back and fatigue d/t large brace she wears up 16 hours a day, having difficulty sleeping with pain )      HPI:  67-year-old female presents for chronic medical management.  She recently had her back surgery.  She is wearing the back brace.  Can only take it off at night into shower.  He does have problems sleeping.  He took Lunesta in the past and she felt like it helped.  She did not like trazodone.  She does complain of some shortness of breath with exertion.    Past Medical History:   Diagnosis Date    COPD (chronic obstructive pulmonary disease)     Hypertension      Past Surgical History:   Procedure Laterality Date    BACK SURGERY       SECTION      HYSTERECTOMY      NECK SURGERY       No family history on file.  Social History     Socioeconomic History    Marital status:    Tobacco Use    Smoking status: Every Day     Packs/day: 1.00     Types: Cigarettes     Passive exposure: Past    Smokeless tobacco: Never   Substance and Sexual Activity    Alcohol use: Never     Review of patient's allergies indicates:  No Known Allergies    Review of Systems   Constitutional:  Negative for activity change, appetite change, chills, fatigue and fever.   HENT:  Negative for congestion, ear pain, postnasal drip, rhinorrhea, sinus pressure, sinus pain and sore throat.    Eyes:  Negative for pain and redness.   Respiratory:  Positive for shortness of breath. Negative for cough and chest tightness.    Cardiovascular:  Negative for chest pain and leg swelling.   Gastrointestinal:  Negative for abdominal distention, abdominal pain, constipation, diarrhea, nausea and vomiting.   Endocrine: Negative for cold intolerance and heat intolerance.   Genitourinary:  Negative for dysuria, frequency and hematuria.   Musculoskeletal:  Positive for arthralgias.  "Negative for back pain and joint swelling.   Skin:  Negative for pallor.   Neurological:  Negative for dizziness and light-headedness.   Psychiatric/Behavioral:  Positive for sleep disturbance. Negative for agitation, decreased concentration and hallucinations. The patient is not nervous/anxious.      Objective:       /68   Pulse 88   Temp 98.8 °F (37.1 °C) (Oral)   Resp 20   Ht 5' 3" (1.6 m)   Wt 48.7 kg (107 lb 6.4 oz)   SpO2 97%   BMI 19.03 kg/m²   Physical Exam  Constitutional:       Appearance: She is well-developed.   HENT:      Head: Normocephalic and atraumatic.      Nose: Nose normal.   Eyes:      Conjunctiva/sclera: Conjunctivae normal.      Pupils: Pupils are equal, round, and reactive to light.   Cardiovascular:      Rate and Rhythm: Normal rate and regular rhythm.      Heart sounds: Murmur heard.   Pulmonary:      Effort: Pulmonary effort is normal.      Breath sounds: Normal breath sounds.   Abdominal:      Palpations: Abdomen is soft.   Musculoskeletal:         General: Normal range of motion.      Cervical back: Normal range of motion and neck supple.      Right lower leg: No edema.      Left lower leg: No edema.      Comments: Back brace in place   Skin:     General: Skin is warm and dry.   Neurological:      Mental Status: She is alert and oriented to person, place, and time.   Psychiatric:         Behavior: Behavior normal.         Thought Content: Thought content normal.       Assessment:     1. LI (dyspnea on exertion)    2. Thrombocyte disorder    3. Chronic obstructive pulmonary disease, unspecified COPD type    4. Insomnia, unspecified type    5. Decreased appetite    6. Murmur        Plan:   LI (dyspnea on exertion)  -     Ambulatory referral/consult to Cardiology; Future; Expected date: 01/25/2023    Thrombocyte disorder    Chronic obstructive pulmonary disease, unspecified COPD type    Insomnia, unspecified type  -     mirtazapine (REMERON) 15 MG tablet; Take 1 tablet (15 mg " total) by mouth every evening.  Dispense: 30 tablet; Refill: 1    Decreased appetite  -     mirtazapine (REMERON) 15 MG tablet; Take 1 tablet (15 mg total) by mouth every evening.  Dispense: 30 tablet; Refill: 1    Murmur  -     Ambulatory referral/consult to Cardiology; Future; Expected date: 01/25/2023      Trial of Remeron.  May assist with weight as well as insomnia.      Recent CBC reviewed.      Refer to cardiology given abnormal CT EKG and shortness of breath.  Consider echocardiogram.     will follow-up with cardiology next month in this clinic    Medication List with Changes/Refills   New Medications    MIRTAZAPINE (REMERON) 15 MG TABLET    Take 1 tablet (15 mg total) by mouth every evening.   Current Medications    ALPRAZOLAM (XANAX) 0.25 MG TABLET    Take 1 tablet (0.25 mg total) by mouth daily as needed for Anxiety.    BUPROPION (WELLBUTRIN XL) 150 MG TB24 TABLET    Take 1 tablet (150 mg total) by mouth once daily.    CIPROFLOXACIN HCL (CIPRO) 500 MG TABLET    Take 1 tablet (500 mg total) by mouth 2 (two) times daily.    FLUTICASONE-UMECLIDIN-VILANTER (TRELEGY ELLIPTA) 100-62.5-25 MCG DSDV    Inhale 1 puff into the lungs once daily.    HYDROCHLOROTHIAZIDE (HYDRODIURIL) 25 MG TABLET    Take 1 tablet (25 mg total) by mouth once daily.    HYDROCODONE-ACETAMINOPHEN (NORCO) 7.5-325 MG PER TABLET    Take 1 tablet by mouth every 8 (eight) hours as needed for Pain.    POTASSIUM CHLORIDE (KLOR-CON) 10 MEQ TBSR    Take 1 tablet (10 mEq total) by mouth once daily.   Changed and/or Refilled Medications    Modified Medication Previous Medication    ALBUTEROL (VENTOLIN HFA) 90 MCG/ACTUATION INHALER albuterol (VENTOLIN HFA) 90 mcg/actuation inhaler       Inhale 2 puffs into the lungs every 6 (six) hours as needed for Wheezing. Rescue    Inhale 2 puffs into the lungs every 6 (six) hours as needed for Wheezing. Rescue            Disclaimer: This note may have been prepared using voice recognition software, it may have  not been extensively proofed, as such there could be errors within the text such as sound alike errors.

## 2023-01-18 NOTE — TELEPHONE ENCOUNTER
----- Message from Rika Macias sent at 1/18/2023 10:39 AM CST -----  Contact: pt  Pt calling for refill for albuterol (VENTOLIN HFA) 90 mcg/actuation inhaler and she can be reached at [phone]    [facility] - [place], [place] - [address]  [address] [facility] [address]  Phone: [phone] Fax: [phone]    Thanks,

## 2023-02-08 ENCOUNTER — OFFICE VISIT (OUTPATIENT)
Dept: FAMILY MEDICINE | Facility: CLINIC | Age: 68
End: 2023-02-08
Payer: MEDICARE

## 2023-02-08 VITALS
WEIGHT: 111.81 LBS | SYSTOLIC BLOOD PRESSURE: 128 MMHG | HEART RATE: 86 BPM | TEMPERATURE: 98 F | RESPIRATION RATE: 20 BRPM | HEIGHT: 63 IN | DIASTOLIC BLOOD PRESSURE: 82 MMHG | BODY MASS INDEX: 19.81 KG/M2 | OXYGEN SATURATION: 96 %

## 2023-02-08 DIAGNOSIS — R63.0 DECREASED APPETITE: ICD-10-CM

## 2023-02-08 DIAGNOSIS — R06.09 DOE (DYSPNEA ON EXERTION): ICD-10-CM

## 2023-02-08 DIAGNOSIS — M54.16 LUMBAR RADICULOPATHY: ICD-10-CM

## 2023-02-08 DIAGNOSIS — D72.829 LEUKOCYTOSIS, UNSPECIFIED TYPE: Primary | ICD-10-CM

## 2023-02-08 DIAGNOSIS — G47.00 INSOMNIA, UNSPECIFIED TYPE: ICD-10-CM

## 2023-02-08 PROCEDURE — 3008F PR BODY MASS INDEX (BMI) DOCUMENTED: ICD-10-PCS | Mod: CPTII,S$GLB,, | Performed by: FAMILY MEDICINE

## 2023-02-08 PROCEDURE — 3074F SYST BP LT 130 MM HG: CPT | Mod: CPTII,S$GLB,, | Performed by: FAMILY MEDICINE

## 2023-02-08 PROCEDURE — 99214 OFFICE O/P EST MOD 30 MIN: CPT | Mod: S$GLB,,, | Performed by: FAMILY MEDICINE

## 2023-02-08 PROCEDURE — 3008F BODY MASS INDEX DOCD: CPT | Mod: CPTII,S$GLB,, | Performed by: FAMILY MEDICINE

## 2023-02-08 PROCEDURE — 99214 PR OFFICE/OUTPT VISIT, EST, LEVL IV, 30-39 MIN: ICD-10-PCS | Mod: S$GLB,,, | Performed by: FAMILY MEDICINE

## 2023-02-08 PROCEDURE — 1159F PR MEDICATION LIST DOCUMENTED IN MEDICAL RECORD: ICD-10-PCS | Mod: CPTII,S$GLB,, | Performed by: FAMILY MEDICINE

## 2023-02-08 PROCEDURE — 1159F MED LIST DOCD IN RCRD: CPT | Mod: CPTII,S$GLB,, | Performed by: FAMILY MEDICINE

## 2023-02-08 PROCEDURE — 3079F DIAST BP 80-89 MM HG: CPT | Mod: CPTII,S$GLB,, | Performed by: FAMILY MEDICINE

## 2023-02-08 PROCEDURE — 3079F PR MOST RECENT DIASTOLIC BLOOD PRESSURE 80-89 MM HG: ICD-10-PCS | Mod: CPTII,S$GLB,, | Performed by: FAMILY MEDICINE

## 2023-02-08 PROCEDURE — 3074F PR MOST RECENT SYSTOLIC BLOOD PRESSURE < 130 MM HG: ICD-10-PCS | Mod: CPTII,S$GLB,, | Performed by: FAMILY MEDICINE

## 2023-02-08 RX ORDER — MIRTAZAPINE 15 MG/1
15 TABLET, FILM COATED ORAL NIGHTLY
Qty: 90 TABLET | Refills: 1 | Status: SHIPPED | OUTPATIENT
Start: 2023-02-08 | End: 2023-07-10 | Stop reason: SDUPTHER

## 2023-02-08 NOTE — PROGRESS NOTES
Subjective:      Patient ID: Daniela Ramires is a 67 y.o. female.    Chief Complaint: Follow-up      HPI:  67-year-old female presents for chronic medical management.  Sleeping better with Remeron.  Appetite improved.  Weight gain noted.  She stopped wearing her back brace.  Was very uncomfortable.  She is trying to limit her movements.  She has appointment coming up with Neurosurgery.  She is following up with Cardiology today regarding her shortness of breath.  She does think this could be related her smoking    Past Medical History:   Diagnosis Date    COPD (chronic obstructive pulmonary disease)     Hypertension      Past Surgical History:   Procedure Laterality Date    BACK SURGERY       SECTION      HYSTERECTOMY      NECK SURGERY       No family history on file.  Social History     Socioeconomic History    Marital status:    Tobacco Use    Smoking status: Every Day     Packs/day: 1.00     Types: Cigarettes     Passive exposure: Past    Smokeless tobacco: Never   Substance and Sexual Activity    Alcohol use: Never     Review of patient's allergies indicates:  No Known Allergies    Review of Systems   Constitutional:  Negative for activity change, appetite change, chills, fatigue and fever.   HENT:  Negative for congestion, ear pain, postnasal drip, rhinorrhea, sinus pressure, sinus pain and sore throat.    Eyes:  Negative for pain and redness.   Respiratory:  Negative for cough, chest tightness and shortness of breath.    Cardiovascular:  Negative for chest pain and leg swelling.   Gastrointestinal:  Negative for abdominal distention, abdominal pain, constipation, diarrhea, nausea and vomiting.   Endocrine: Negative for cold intolerance and heat intolerance.   Genitourinary:  Negative for dysuria, frequency and hematuria.   Musculoskeletal:  Negative for arthralgias, back pain and joint swelling.   Skin:  Negative for pallor.   Neurological:  Negative for dizziness and light-headedness.  "  Psychiatric/Behavioral:  Negative for agitation, decreased concentration and hallucinations. The patient is not nervous/anxious.      Objective:       /82   Pulse 86   Temp 98 °F (36.7 °C) (Oral)   Resp 20   Ht 5' 3" (1.6 m)   Wt 50.7 kg (111 lb 12.8 oz)   SpO2 96%   BMI 19.80 kg/m²   Physical Exam  Constitutional:       Appearance: She is well-developed.   HENT:      Head: Normocephalic and atraumatic.      Nose: Nose normal.   Eyes:      Conjunctiva/sclera: Conjunctivae normal.      Pupils: Pupils are equal, round, and reactive to light.   Cardiovascular:      Rate and Rhythm: Normal rate and regular rhythm.      Heart sounds: Normal heart sounds.   Pulmonary:      Effort: Pulmonary effort is normal.      Breath sounds: Normal breath sounds.   Abdominal:      Palpations: Abdomen is soft.   Musculoskeletal:         General: Normal range of motion.      Cervical back: Normal range of motion and neck supple.   Skin:     General: Skin is warm and dry.   Neurological:      Mental Status: She is alert and oriented to person, place, and time.   Psychiatric:         Behavior: Behavior normal.         Thought Content: Thought content normal.       Assessment:     1. Leukocytosis, unspecified type    2. Insomnia, unspecified type    3. Decreased appetite    4. LI (dyspnea on exertion)    5. Lumbar radiculopathy        Plan:   Leukocytosis, unspecified type  -     CBC Auto Differential; Future; Expected date: 02/08/2023    Insomnia, unspecified type  -     mirtazapine (REMERON) 15 MG tablet; Take 1 tablet (15 mg total) by mouth every evening.  Dispense: 90 tablet; Refill: 1    Decreased appetite  -     mirtazapine (REMERON) 15 MG tablet; Take 1 tablet (15 mg total) by mouth every evening.  Dispense: 90 tablet; Refill: 1    LI (dyspnea on exertion)    Lumbar radiculopathy      Continue Remeron.      Repeat CBC pending.      Follow-up with Neurosurgery.      Keep appointment with cardiologist today.  "     Smoking cessation encouraged    Follow-up in 3 months.  Sooner if needed    Weight gain noted    Medication List with Changes/Refills   Current Medications    ALBUTEROL (VENTOLIN HFA) 90 MCG/ACTUATION INHALER    Inhale 2 puffs into the lungs every 6 (six) hours as needed for Wheezing. Rescue    ALPRAZOLAM (XANAX) 0.25 MG TABLET    Take 1 tablet (0.25 mg total) by mouth daily as needed for Anxiety.    BUPROPION (WELLBUTRIN XL) 150 MG TB24 TABLET    Take 1 tablet (150 mg total) by mouth once daily.    CIPROFLOXACIN HCL (CIPRO) 500 MG TABLET    Take 1 tablet (500 mg total) by mouth 2 (two) times daily.    FLUTICASONE-UMECLIDIN-VILANTER (TRELEGY ELLIPTA) 100-62.5-25 MCG DSDV    Inhale 1 puff into the lungs once daily.    HYDROCHLOROTHIAZIDE (HYDRODIURIL) 25 MG TABLET    Take 1 tablet (25 mg total) by mouth once daily.    HYDROCODONE-ACETAMINOPHEN (NORCO) 7.5-325 MG PER TABLET    Take 1 tablet by mouth every 8 (eight) hours as needed for Pain.    POTASSIUM CHLORIDE (KLOR-CON) 10 MEQ TBSR    Take 1 tablet (10 mEq total) by mouth once daily.   Changed and/or Refilled Medications    Modified Medication Previous Medication    MIRTAZAPINE (REMERON) 15 MG TABLET mirtazapine (REMERON) 15 MG tablet       Take 1 tablet (15 mg total) by mouth every evening.    Take 1 tablet (15 mg total) by mouth every evening.            Disclaimer: This note may have been prepared using voice recognition software, it may have not been extensively proofed, as such there could be errors within the text such as sound alike errors.

## 2023-02-09 LAB
ABS NRBC COUNT: 0 X 10 3/UL (ref 0–0.01)
ABSOLUTE BASOPHIL: 0.19 X 10 3/UL (ref 0–0.22)
ABSOLUTE EOSINOPHIL: 0 X 10 3/UL (ref 0.04–0.54)
ABSOLUTE LYMPHOCYTE: 2.96 X 10 3/UL (ref 0.86–4.75)
ABSOLUTE MONOCYTE: 1.67 X 10 3/UL (ref 0.22–1.08)
BANDS: 3 % (ref 1–5)
BASOPHILS NFR BLD: 1 % (ref 0–1)
EOSINOPHIL NFR BLD: 0 % (ref 1–7)
HCT VFR BLD AUTO: 35.9 % (ref 37–47)
HGB BLD-MCNC: 12.5 G/DL (ref 12–16)
LYMPHOCYTES NFR BLD: 16 % (ref 19–53)
MCH RBC QN AUTO: 33.6 PG (ref 27–32)
MCHC RBC AUTO-ENTMCNC: 34.8 G/DL (ref 32–36)
MCV RBC AUTO: 96.5 FL (ref 82–100)
MONOCYTES NFR BLD: 9 % (ref 5–13)
NEUTROPHILS # BLD AUTO: 13.69 X 10 3/UL (ref 2.32–6.7)
NUCLEATED RED BLOOD CELLS: 0 /100 WBC (ref 0–0.2)
PLATELET # BLD AUTO: 537 X 10 3/UL (ref 135–400)
RBC # BLD AUTO: 3.72 X 10 6/UL (ref 4.2–5.4)
RBC & PLT MORPHOLOGY: ABNORMAL
RDW-SD: 48.6 FL (ref 37–54)
SEGMENTERS: 71 % (ref 34–71)
TOTAL GRANULOCYTES: 13.88 X 10 3/UL
WBC # BLD: 18.5 X 10 3/UL (ref 4.3–10.8)

## 2023-02-10 DIAGNOSIS — D72.829 LEUKOCYTOSIS, UNSPECIFIED TYPE: Primary | ICD-10-CM

## 2023-02-16 ENCOUNTER — CLINICAL SUPPORT (OUTPATIENT)
Dept: FAMILY MEDICINE | Facility: CLINIC | Age: 68
End: 2023-02-16
Payer: MEDICARE

## 2023-02-16 DIAGNOSIS — D72.829 LEUKOCYTOSIS, UNSPECIFIED TYPE: ICD-10-CM

## 2023-02-16 LAB
ABS NRBC COUNT: 0 X 10 3/UL (ref 0–0.01)
ABSOLUTE BASOPHIL: 0.12 X 10 3/UL (ref 0–0.22)
ABSOLUTE EOSINOPHIL: 0.07 X 10 3/UL (ref 0.04–0.54)
ABSOLUTE IMMATURE GRAN: 0.03 X 10 3/UL (ref 0–0.04)
ABSOLUTE LYMPHOCYTE: 1.64 X 10 3/UL (ref 0.86–4.75)
ABSOLUTE MONOCYTE: 0.95 X 10 3/UL (ref 0.22–1.08)
BASOPHILS NFR BLD: 1.3 % (ref 0.2–1.2)
EOSINOPHIL NFR BLD: 0.8 % (ref 0.7–7)
HCT VFR BLD AUTO: 41.8 % (ref 37–47)
HGB BLD-MCNC: 14.7 G/DL (ref 12–16)
IMMATURE GRANULOCYTES: 0.3 % (ref 0–0.5)
LYMPHOCYTES NFR BLD: 17.6 % (ref 19.3–53.1)
MCH RBC QN AUTO: 33.9 PG (ref 27–32)
MCHC RBC AUTO-ENTMCNC: 35.2 G/DL (ref 32–36)
MCV RBC AUTO: 96.3 FL (ref 82–100)
MONOCYTES NFR BLD: 10.2 % (ref 4.7–12.5)
NEUTROPHILS # BLD AUTO: 6.51 X 10 3/UL (ref 2.15–7.56)
NEUTROPHILS NFR BLD: 69.8 % (ref 34–71.1)
NUCLEATED RED BLOOD CELLS: 0 /100 WBC (ref 0–0.2)
PLATELET # BLD AUTO: 551 X 10 3/UL (ref 135–400)
RBC # BLD AUTO: 4.34 X 10 6/UL (ref 4.2–5.4)
RDW-SD: 49.2 FL (ref 37–54)
WBC # BLD: 9.32 X 10 3/UL (ref 4.3–10.8)

## 2023-03-09 DIAGNOSIS — M47.812 SPONDYLOSIS WITHOUT MYELOPATHY OR RADICULOPATHY, CERVICAL REGION: Primary | ICD-10-CM

## 2023-03-17 ENCOUNTER — HOSPITAL ENCOUNTER (OUTPATIENT)
Dept: RADIOLOGY | Facility: HOSPITAL | Age: 68
Discharge: HOME | End: 2023-03-17
Attending: ANESTHESIOLOGY
Payer: MEDICARE

## 2023-03-17 DIAGNOSIS — M47.812 SPONDYLOSIS WITHOUT MYELOPATHY OR RADICULOPATHY, CERVICAL REGION: ICD-10-CM

## 2023-03-17 PROCEDURE — 72141 MRI NECK SPINE W/O DYE: CPT | Mod: TC

## 2023-05-10 ENCOUNTER — PATIENT OUTREACH (OUTPATIENT)
Dept: ADMINISTRATIVE | Facility: HOSPITAL | Age: 68
End: 2023-05-10
Payer: MEDICARE

## 2023-05-10 NOTE — PROGRESS NOTES
MAMMOGRAM: Per chart review pt is overdue for mammo screen, spoke to pt she is ok with having mammo at [facility], will send to [facility] to assist with scheduling.

## 2023-05-10 NOTE — Clinical Note
Pt didn't write down location of mammo, can you send me scheduling phone number so I can give to her.  Thanks  Sonali

## 2023-05-10 NOTE — PROGRESS NOTES
Original MMG order faxed to [place] with [facility]. She will contact pt tomorrow and get her MMG scheduled.   MMG scheduled for 5/15/2023. Remind me created.

## 2023-05-12 ENCOUNTER — PATIENT MESSAGE (OUTPATIENT)
Dept: ADMINISTRATIVE | Facility: HOSPITAL | Age: 68
End: 2023-05-12
Payer: MEDICARE

## 2023-05-18 NOTE — PROGRESS NOTES
Notified by scheduling dept that pt was a no show for her MMG. Also that pt has been a no show for every test scheduled in the last 24 mths except for one CT Scan. They have sent PCP a notification.

## 2023-05-25 RX ORDER — GABAPENTIN 800 MG/1
800 TABLET ORAL 3 TIMES DAILY
COMMUNITY

## 2023-05-31 ENCOUNTER — ANESTHESIA EVENT (OUTPATIENT)
Dept: SURGERY | Facility: HOSPITAL | Age: 68
End: 2023-05-31
Payer: MEDICARE

## 2023-05-31 NOTE — ANESTHESIA PREPROCEDURE EVALUATION
05/31/2023  Daniela Ramires is a 67 y.o., female.      Pre-op Assessment    I have reviewed the Patient Summary Reports.    I have reviewed the NPO Status.   I have reviewed the Medications.     Review of Systems  Anesthesia Hx:  No problems with previous Anesthesia  Neg history of prior surgery. Denies Family Hx of Anesthesia complications.   Denies Personal Hx of Anesthesia complications.   Social:  Smoker    Hematology/Oncology:  Hematology Normal   Oncology Normal     EENT/Dental:EENT/Dental Normal   Cardiovascular:   Hypertension    Pulmonary:   COPD    Renal/:  Renal/ Normal     Hepatic/GI:  Hepatic/GI Normal    Musculoskeletal:  Spine Disorders: cervical    Neurological:   Neuromuscular Disease,    Endocrine:  Endocrine Normal    Dermatological:  Skin Normal    Psych:  Psychiatric Normal           Physical Exam  General: Cooperative and Alert    Airway:  Mallampati: I   Mouth Opening: Normal  TM Distance: Normal  Tongue: Normal  Neck ROM: Normal ROM    Dental:  Intact        Anesthesia Plan  Type of Anesthesia, risks & benefits discussed:    Anesthesia Type: Gen Natural Airway  Intra-op Monitoring Plan: Standard ASA Monitors  Post Op Pain Control Plan: multimodal analgesia  Induction:  IV  Informed Consent: Informed consent signed with the Patient and all parties understand the risks and agree with anesthesia plan.  All questions answered. Patient consented to blood products? No  ASA Score: 2  Day of Surgery Review of History & Physical: I have interviewed and examined the patient. I have reviewed the patient's H&P dated: There are no significant changes.     Ready For Surgery From Anesthesia Perspective.     .

## 2023-06-02 ENCOUNTER — ANESTHESIA (OUTPATIENT)
Dept: SURGERY | Facility: HOSPITAL | Age: 68
End: 2023-06-02
Payer: MEDICARE

## 2023-06-02 ENCOUNTER — HOSPITAL ENCOUNTER (OUTPATIENT)
Facility: HOSPITAL | Age: 68
Discharge: HOME | End: 2023-06-02
Attending: ANESTHESIOLOGY | Admitting: ANESTHESIOLOGY
Payer: MEDICARE

## 2023-06-02 DIAGNOSIS — M54.12 CERVICAL RADICULOPATHY: ICD-10-CM

## 2023-06-02 PROCEDURE — 37000008 HC ANESTHESIA 1ST 15 MINUTES: Performed by: ANESTHESIOLOGY

## 2023-06-02 PROCEDURE — 63600175 PHARM REV CODE 636 W HCPCS

## 2023-06-02 PROCEDURE — 25500020 PHARM REV CODE 255: Performed by: ANESTHESIOLOGY

## 2023-06-02 PROCEDURE — 63600175 PHARM REV CODE 636 W HCPCS: Performed by: ANESTHESIOLOGY

## 2023-06-02 PROCEDURE — D9220A PRA ANESTHESIA: Mod: ,,, | Performed by: NURSE ANESTHETIST, CERTIFIED REGISTERED

## 2023-06-02 PROCEDURE — 62321 NJX INTERLAMINAR CRV/THRC: CPT | Performed by: ANESTHESIOLOGY

## 2023-06-02 PROCEDURE — D9220A PRA ANESTHESIA: ICD-10-PCS | Mod: ,,, | Performed by: NURSE ANESTHETIST, CERTIFIED REGISTERED

## 2023-06-02 PROCEDURE — 63600175 PHARM REV CODE 636 W HCPCS: Performed by: NURSE ANESTHETIST, CERTIFIED REGISTERED

## 2023-06-02 RX ORDER — FENTANYL CITRATE 50 UG/ML
INJECTION, SOLUTION INTRAMUSCULAR; INTRAVENOUS
Status: DISCONTINUED | OUTPATIENT
Start: 2023-06-02 | End: 2023-06-02

## 2023-06-02 RX ORDER — MIDAZOLAM HYDROCHLORIDE 1 MG/ML
INJECTION INTRAMUSCULAR; INTRAVENOUS
Status: DISCONTINUED | OUTPATIENT
Start: 2023-06-02 | End: 2023-06-02

## 2023-06-02 RX ORDER — METHYLPREDNISOLONE ACETATE 80 MG/ML
INJECTION, SUSPENSION INTRA-ARTICULAR; INTRALESIONAL; INTRAMUSCULAR; SOFT TISSUE
Status: DISCONTINUED | OUTPATIENT
Start: 2023-06-02 | End: 2023-06-02 | Stop reason: HOSPADM

## 2023-06-02 RX ORDER — SODIUM CHLORIDE, SODIUM LACTATE, POTASSIUM CHLORIDE, CALCIUM CHLORIDE 600; 310; 30; 20 MG/100ML; MG/100ML; MG/100ML; MG/100ML
INJECTION, SOLUTION INTRAVENOUS CONTINUOUS
Status: ACTIVE | OUTPATIENT
Start: 2023-06-02

## 2023-06-02 RX ORDER — IOPAMIDOL 612 MG/ML
INJECTION, SOLUTION INTRATHECAL
Status: DISCONTINUED | OUTPATIENT
Start: 2023-06-02 | End: 2023-06-02 | Stop reason: HOSPADM

## 2023-06-02 RX ADMIN — FENTANYL CITRATE 100 MCG: 50 INJECTION, SOLUTION INTRAMUSCULAR; INTRAVENOUS at 06:06

## 2023-06-02 RX ADMIN — MIDAZOLAM HYDROCHLORIDE 2 MG: 1 INJECTION INTRAMUSCULAR; INTRAVENOUS at 06:06

## 2023-06-02 RX ADMIN — SODIUM CHLORIDE, POTASSIUM CHLORIDE, SODIUM LACTATE AND CALCIUM CHLORIDE: 600; 310; 30; 20 INJECTION, SOLUTION INTRAVENOUS at 05:06

## 2023-06-02 NOTE — ANESTHESIA POSTPROCEDURE EVALUATION
Anesthesia Post Evaluation    Patient: Daniela Ramires    Procedure(s) Performed: Procedure(s) (LRB):  INJECTION, STEROID, SPINE, CERVICAL, EPIDURAL (ILESI C7-T1) (N/A)    Final Anesthesia Type: MAC      Patient participation: Yes- Able to Participate  Level of consciousness: awake and alert and oriented  Post-procedure vital signs: reviewed and stable  Pain management: adequate  Airway patency: patent    PONV status at discharge: No PONV  Anesthetic complications: no      Cardiovascular status: blood pressure returned to baseline  Respiratory status: unassisted, spontaneous ventilation and room air  Hydration status: euvolemic  Follow-up not needed.          Vitals Value Taken Time     06/02/23 0701     06/02/23 0701     06/02/23 0701     06/02/23 0701     06/02/23 0701         No case tracking events are documented in the log.      Pain/Ludwin Score: No data recorded

## 2023-06-02 NOTE — H&P
Patient presenting for Procedure(s) (LRB):  INJECTION, STEROID, SPINE, CERVICAL, EPIDURAL (ILESI C7-T1) (N/A)     Patient on Anti-coagulation No    No health changes since previous encounter    Past Medical History:   Diagnosis Date    Cervical radiculopathy     COPD (chronic obstructive pulmonary disease)     Hypertension      Past Surgical History:   Procedure Laterality Date    BACK SURGERY       SECTION      HYSTERECTOMY      NECK SURGERY       Review of patient's allergies indicates:  No Known Allergies     No current facility-administered medications on file prior to encounter.     Current Outpatient Medications on File Prior to Encounter   Medication Sig Dispense Refill    albuterol (VENTOLIN HFA) 90 mcg/actuation inhaler Inhale 2 puffs into the lungs every 6 (six) hours as needed for Wheezing. Rescue 18 g 2    ALPRAZolam (XANAX) 0.25 MG tablet TAKE 1 TABLET BY MOUTH ONCE DAILY AS NEEDED FOR ANXIETY 30 tablet 2    buPROPion (WELLBUTRIN XL) 150 MG TB24 tablet Take 1 tablet (150 mg total) by mouth once daily. 30 tablet 11    ciprofloxacin HCl (CIPRO) 500 MG tablet Take 1 tablet (500 mg total) by mouth 2 (two) times daily. 6 tablet 0    fluticasone-umeclidin-vilanter (TRELEGY ELLIPTA) 100-62.5-25 mcg DsDv Inhale 1 puff into the lungs once daily. 60 each 3    gabapentin (NEURONTIN) 800 MG tablet Take 800 mg by mouth 3 (three) times daily.      hydroCHLOROthiazide (HYDRODIURIL) 25 MG tablet Take 1 tablet (25 mg total) by mouth once daily. 30 tablet 3    HYDROcodone-acetaminophen (NORCO) 7.5-325 mg per tablet Take 1 tablet by mouth every 8 (eight) hours as needed for Pain. 20 tablet 0    mirtazapine (REMERON) 15 MG tablet Take 1 tablet (15 mg total) by mouth every evening. 90 tablet 1    potassium chloride (KLOR-CON) 10 MEQ TbSR Take 1 tablet (10 mEq total) by mouth once daily. 30 tablet 1        PMHx, PSHx, Allergies, Medications reviewed in epic    ROS negative except pain complaints in  "HPI    OBJECTIVE:    BP (!) 146/83   Pulse 72   Temp 97.5 °F (36.4 °C) (Oral)   Resp 20   Ht 5' 2.99" (1.6 m)   Wt 50.7 kg (111 lb 12.4 oz)   SpO2 97%   Breastfeeding No   BMI 19.80 kg/m²     PHYSICAL EXAMINATION:    GENERAL: Well appearing, in no acute distress, alert and oriented x3.  PSYCH:  Mood and affect appropriate.  SKIN: Skin color, texture, turgor normal, no rashes or lesions which will impact the procedure.  CV: RRR with palpation of the radial artery.  PULM: No evidence of respiratory difficulty, symmetric chest rise. Clear to auscultation.  NEURO: Cranial nerves grossly intact.    Plan:    Proceed with procedure as planned Procedure(s) (LRB):  INJECTION, STEROID, SPINE, CERVICAL, EPIDURAL (ILESI C7-T1) (N/A)    Shahram Bonilla MD  06/02/2023            "

## 2023-06-02 NOTE — DISCHARGE SUMMARY
[facility] - Periop Services  Discharge Note  Short Stay    Procedure(s) (LRB):  INJECTION, STEROID, SPINE, CERVICAL, EPIDURAL (ILESI C7-T1) (N/A)      OUTCOME: Patient tolerated treatment/procedure well without complication and is now ready for discharge.    DISPOSITION: Home or Self Care    FINAL DIAGNOSIS:  Cervical radiculopathy    FOLLOWUP: In clinic    DISCHARGE INSTRUCTIONS:  No discharge procedures on file.      Clinical Reference Documents Added to Patient Instructions         Document    EPIDURAL INJECTION (ENGLISH)            TIME SPENT ON DISCHARGE: 2 minutes

## 2023-06-05 VITALS
DIASTOLIC BLOOD PRESSURE: 76 MMHG | WEIGHT: 111.75 LBS | BODY MASS INDEX: 19.8 KG/M2 | HEART RATE: 81 BPM | SYSTOLIC BLOOD PRESSURE: 126 MMHG | HEIGHT: 63 IN | TEMPERATURE: 98 F | RESPIRATION RATE: 18 BRPM | OXYGEN SATURATION: 94 %

## 2023-06-12 DIAGNOSIS — I10 HYPERTENSION, UNSPECIFIED TYPE: ICD-10-CM

## 2023-06-12 RX ORDER — HYDROCHLOROTHIAZIDE 25 MG/1
25 TABLET ORAL DAILY
Qty: 30 TABLET | Refills: 3 | Status: SHIPPED | OUTPATIENT
Start: 2023-06-12 | End: 2023-07-10 | Stop reason: SDUPTHER

## 2023-07-10 ENCOUNTER — OFFICE VISIT (OUTPATIENT)
Dept: FAMILY MEDICINE | Facility: CLINIC | Age: 68
End: 2023-07-10
Payer: MEDICARE

## 2023-07-10 VITALS
HEIGHT: 62 IN | BODY MASS INDEX: 19.29 KG/M2 | OXYGEN SATURATION: 95 % | DIASTOLIC BLOOD PRESSURE: 68 MMHG | HEART RATE: 89 BPM | RESPIRATION RATE: 20 BRPM | SYSTOLIC BLOOD PRESSURE: 110 MMHG | TEMPERATURE: 98 F | WEIGHT: 104.81 LBS

## 2023-07-10 DIAGNOSIS — F17.200 TOBACCO DEPENDENCE: ICD-10-CM

## 2023-07-10 DIAGNOSIS — Z79.899 ON LONG TERM DRUG THERAPY: ICD-10-CM

## 2023-07-10 DIAGNOSIS — F41.9 ANXIETY: ICD-10-CM

## 2023-07-10 DIAGNOSIS — I10 HYPERTENSION, UNSPECIFIED TYPE: ICD-10-CM

## 2023-07-10 DIAGNOSIS — G89.29 CHRONIC LOW BACK PAIN, UNSPECIFIED BACK PAIN LATERALITY, UNSPECIFIED WHETHER SCIATICA PRESENT: ICD-10-CM

## 2023-07-10 DIAGNOSIS — R53.83 FATIGUE, UNSPECIFIED TYPE: ICD-10-CM

## 2023-07-10 DIAGNOSIS — R63.0 DECREASED APPETITE: ICD-10-CM

## 2023-07-10 DIAGNOSIS — G47.00 INSOMNIA, UNSPECIFIED TYPE: ICD-10-CM

## 2023-07-10 DIAGNOSIS — R06.02 SOB (SHORTNESS OF BREATH): ICD-10-CM

## 2023-07-10 DIAGNOSIS — M54.50 CHRONIC LOW BACK PAIN, UNSPECIFIED BACK PAIN LATERALITY, UNSPECIFIED WHETHER SCIATICA PRESENT: ICD-10-CM

## 2023-07-10 DIAGNOSIS — R21 RASH: ICD-10-CM

## 2023-07-10 DIAGNOSIS — J44.9 CHRONIC OBSTRUCTIVE PULMONARY DISEASE, UNSPECIFIED COPD TYPE: Primary | ICD-10-CM

## 2023-07-10 PROBLEM — I71.40 ABDOMINAL AORTIC ANEURYSM (AAA) WITHOUT RUPTURE: Status: RESOLVED | Noted: 2022-10-06 | Resolved: 2023-07-10

## 2023-07-10 LAB
ABS NRBC COUNT: 0 X 10 3/UL (ref 0–0.01)
ABSOLUTE BASOPHIL: 0.1 X 10 3/UL (ref 0–0.22)
ABSOLUTE EOSINOPHIL: 0.06 X 10 3/UL (ref 0.04–0.54)
ABSOLUTE IMMATURE GRAN: 0.04 X 10 3/UL (ref 0–0.04)
ABSOLUTE LYMPHOCYTE: 1.28 X 10 3/UL (ref 0.86–4.75)
ABSOLUTE MONOCYTE: 0.73 X 10 3/UL (ref 0.22–1.08)
ALBUMIN SERPL-MCNC: 4.5 G/DL (ref 3.5–5.2)
ALBUMIN/GLOB SERPL ELPH: 1.7 {RATIO} (ref 1–2.7)
ALP ISOS SERPL LEV INH-CCNC: 109 U/L (ref 35–105)
ALT (SGPT): 12 U/L (ref 0–33)
ANION GAP SERPL CALC-SCNC: 15 MMOL/L (ref 8–17)
AST SERPL-CCNC: 16 U/L (ref 0–32)
BASOPHILS NFR BLD: 1.1 % (ref 0.2–1.2)
BILIRUBIN, TOTAL: 0.37 MG/DL (ref 0–1.2)
BUN/CREAT SERPL: 23.8 (ref 6–20)
CALCIUM SERPL-MCNC: 9.6 MG/DL (ref 8.6–10.2)
CARBON DIOXIDE, CO2: 27 MMOL/L (ref 22–29)
CHLORIDE: 91 MMOL/L (ref 98–107)
CHOLEST SERPL-MSCNC: 270 MG/DL (ref 100–200)
CREAT SERPL-MCNC: 0.73 MG/DL (ref 0.5–0.9)
EOSINOPHIL NFR BLD: 0.7 % (ref 0.7–7)
ESTIMATED AVERAGE GLUCOSE: 111 MG/DL
GFR ESTIMATION: 91.21 ML/MIN/1.73M2
GLOBULIN: 2.6 G/DL (ref 1.5–4.5)
GLUCOSE: 90 MG/DL (ref 82–115)
HBA1C MFR BLD: 5.5 % (ref 4–6)
HCT VFR BLD AUTO: 39.5 % (ref 37–47)
HDLC SERPL-MCNC: 50 MG/DL
HGB BLD-MCNC: 13.6 G/DL (ref 12–16)
IMMATURE GRANULOCYTES: 0.4 % (ref 0–0.5)
LDL/HDL RATIO: 3.9 (ref 1–3)
LDLC SERPL CALC-MCNC: 193.4 MG/DL (ref 0–100)
LYMPHOCYTES NFR BLD: 14.3 % (ref 19.3–53.1)
MCH RBC QN AUTO: 33.6 PG (ref 27–32)
MCHC RBC AUTO-ENTMCNC: 34.4 G/DL (ref 32–36)
MCV RBC AUTO: 97.5 FL (ref 82–100)
MONOCYTES NFR BLD: 8.2 % (ref 4.7–12.5)
NEUTROPHILS # BLD AUTO: 6.73 X 10 3/UL (ref 2.15–7.56)
NEUTROPHILS NFR BLD: 75.3 % (ref 34–71.1)
NUCLEATED RED BLOOD CELLS: 0 /100 WBC (ref 0–0.2)
PLATELET # BLD AUTO: 551 X 10 3/UL (ref 135–400)
POTASSIUM: 5.1 MMOL/L (ref 3.5–5.1)
PROT SNV-MCNC: 7.1 G/DL (ref 6.4–8.3)
RBC # BLD AUTO: 4.05 X 10 6/UL (ref 4.2–5.4)
RDW-SD: 56.1 FL (ref 37–54)
SMEAR REVIEW: NORMAL
SODIUM: 133 MMOL/L (ref 136–145)
T4, FREE: 1.19 NG/DL (ref 0.93–1.7)
TRIGL SERPL-MCNC: 133 MG/DL (ref 0–150)
TSH SERPL DL<=0.005 MIU/L-ACNC: 1.43 UIU/ML (ref 0.27–4.2)
UREA NITROGEN (BUN): 17.4 MG/DL (ref 8–23)
WBC # BLD: 8.94 X 10 3/UL (ref 4.3–10.8)

## 2023-07-10 PROCEDURE — 3008F BODY MASS INDEX DOCD: CPT | Mod: CPTII,S$GLB,, | Performed by: FAMILY MEDICINE

## 2023-07-10 PROCEDURE — 3078F DIAST BP <80 MM HG: CPT | Mod: CPTII,S$GLB,, | Performed by: FAMILY MEDICINE

## 2023-07-10 PROCEDURE — 3074F PR MOST RECENT SYSTOLIC BLOOD PRESSURE < 130 MM HG: ICD-10-PCS | Mod: CPTII,S$GLB,, | Performed by: FAMILY MEDICINE

## 2023-07-10 PROCEDURE — 3078F PR MOST RECENT DIASTOLIC BLOOD PRESSURE < 80 MM HG: ICD-10-PCS | Mod: CPTII,S$GLB,, | Performed by: FAMILY MEDICINE

## 2023-07-10 PROCEDURE — 1125F AMNT PAIN NOTED PAIN PRSNT: CPT | Mod: CPTII,S$GLB,, | Performed by: FAMILY MEDICINE

## 2023-07-10 PROCEDURE — 99214 PR OFFICE/OUTPT VISIT, EST, LEVL IV, 30-39 MIN: ICD-10-PCS | Mod: S$GLB,,, | Performed by: FAMILY MEDICINE

## 2023-07-10 PROCEDURE — 99214 OFFICE O/P EST MOD 30 MIN: CPT | Mod: S$GLB,,, | Performed by: FAMILY MEDICINE

## 2023-07-10 PROCEDURE — 1125F PR PAIN SEVERITY QUANTIFIED, PAIN PRESENT: ICD-10-PCS | Mod: CPTII,S$GLB,, | Performed by: FAMILY MEDICINE

## 2023-07-10 PROCEDURE — 1159F MED LIST DOCD IN RCRD: CPT | Mod: CPTII,S$GLB,, | Performed by: FAMILY MEDICINE

## 2023-07-10 PROCEDURE — 3074F SYST BP LT 130 MM HG: CPT | Mod: CPTII,S$GLB,, | Performed by: FAMILY MEDICINE

## 2023-07-10 PROCEDURE — 1159F PR MEDICATION LIST DOCUMENTED IN MEDICAL RECORD: ICD-10-PCS | Mod: CPTII,S$GLB,, | Performed by: FAMILY MEDICINE

## 2023-07-10 PROCEDURE — 3008F PR BODY MASS INDEX (BMI) DOCUMENTED: ICD-10-PCS | Mod: CPTII,S$GLB,, | Performed by: FAMILY MEDICINE

## 2023-07-10 RX ORDER — ALPRAZOLAM 0.25 MG/1
0.25 TABLET ORAL DAILY PRN
Qty: 30 TABLET | Refills: 1 | Status: SHIPPED | OUTPATIENT
Start: 2023-07-10

## 2023-07-10 RX ORDER — MUPIROCIN 20 MG/G
OINTMENT TOPICAL 3 TIMES DAILY
Qty: 30 G | Refills: 0 | Status: SHIPPED | OUTPATIENT
Start: 2023-07-10

## 2023-07-10 RX ORDER — HYDROCHLOROTHIAZIDE 25 MG/1
25 TABLET ORAL DAILY
Qty: 30 TABLET | Refills: 3 | Status: SHIPPED | OUTPATIENT
Start: 2023-07-10 | End: 2024-02-27 | Stop reason: SDUPTHER

## 2023-07-10 RX ORDER — BUPROPION HYDROCHLORIDE 150 MG/1
150 TABLET ORAL DAILY
Qty: 30 TABLET | Refills: 11 | Status: SHIPPED | OUTPATIENT
Start: 2023-07-10 | End: 2024-07-09

## 2023-07-10 RX ORDER — MIRTAZAPINE 15 MG/1
15 TABLET, FILM COATED ORAL NIGHTLY
Qty: 90 TABLET | Refills: 1 | Status: SHIPPED | OUTPATIENT
Start: 2023-07-10 | End: 2024-07-09

## 2023-07-10 RX ORDER — BUSPIRONE HYDROCHLORIDE 7.5 MG/1
7.5 TABLET ORAL 2 TIMES DAILY
Qty: 60 TABLET | Refills: 3 | Status: SHIPPED | OUTPATIENT
Start: 2023-07-10 | End: 2024-07-09

## 2023-07-10 NOTE — PROGRESS NOTES
Subjective:      Patient ID: Daniela Ramires is a 67 y.o. female.    Chief Complaint: Shortness of Breath (Pt. Reports she has been very sob for the last 6 weeks, she is having stress and anxiety wants to try to stop smoking, but having great difficulty stopping, would like to try med for this. ) and Anxiety (Pt. Reports she has severe anxiety right now and would like stronger anxiety meds. )      HPI:  67-year-old female who presents for multiple complaints.  Has a rash.  Located on bilateral arms.  Switches between arms.  Does not cause any discomfort.  Reports feeling more short of breath.  Has been out of her daily inhaler.  Wants to quit smoking.  Would like to try medicine.  Smoking up to a pack a day.  Did not complete testing as recommended by Cardiology secondary to cost.  Currently taking chronic pain meds for her back pain.  Does not take them every day.  Would like to increase her dose of Xanax.  Feels more anxious than normal.    Past Medical History:   Diagnosis Date    Cervical radiculopathy     COPD (chronic obstructive pulmonary disease)     Hypertension      Past Surgical History:   Procedure Laterality Date    BACK SURGERY       SECTION      EPIDURAL STEROID INJECTION INTO CERVICAL SPINE N/A 2023    Procedure: INJECTION, STEROID, SPINE, CERVICAL, EPIDURAL (ILESI C7-T1);  Surgeon: Shahram Bonilla MD;  Location: [facility];  Service: Pain Management;  Laterality: N/A;    HYSTERECTOMY      NECK SURGERY       Family History   Problem Relation Age of Onset    No Known Problems Mother     No Known Problems Father      Social History     Socioeconomic History    Marital status:    Tobacco Use    Smoking status: Every Day     Packs/day: 1.00     Types: Cigarettes     Passive exposure: Past    Smokeless tobacco: Never   Substance and Sexual Activity    Alcohol use: Never    Drug use: Never     Review of patient's allergies indicates:  No Known Allergies    Review of Systems  "  Constitutional:  Negative for activity change, appetite change, chills, fatigue and fever.   HENT:  Negative for congestion, ear pain, postnasal drip, rhinorrhea, sinus pressure, sinus pain and sore throat.    Eyes:  Negative for pain and redness.   Respiratory:  Positive for shortness of breath. Negative for cough and chest tightness.    Cardiovascular:  Negative for chest pain and leg swelling.   Gastrointestinal:  Negative for abdominal distention, abdominal pain, constipation, diarrhea, nausea and vomiting.   Endocrine: Negative for cold intolerance and heat intolerance.   Genitourinary:  Negative for dysuria, frequency and hematuria.   Musculoskeletal:  Positive for back pain. Negative for arthralgias and joint swelling.   Skin:  Positive for rash. Negative for pallor.   Neurological:  Negative for dizziness and light-headedness.   Psychiatric/Behavioral:  Negative for agitation, decreased concentration and hallucinations. The patient is nervous/anxious.      Objective:       /68   Pulse 89   Temp 98 °F (36.7 °C) (Oral)   Resp 20   Ht 5' 2" (1.575 m)   Wt 47.5 kg (104 lb 12.8 oz)   SpO2 95%   BMI 19.17 kg/m²   Physical Exam  Constitutional:       Appearance: She is well-developed.   HENT:      Head: Normocephalic and atraumatic.      Nose: Nose normal.   Eyes:      Conjunctiva/sclera: Conjunctivae normal.      Pupils: Pupils are equal, round, and reactive to light.   Cardiovascular:      Rate and Rhythm: Normal rate and regular rhythm.      Heart sounds: Normal heart sounds.   Pulmonary:      Effort: Pulmonary effort is normal.      Breath sounds: Normal breath sounds.   Abdominal:      Palpations: Abdomen is soft.   Musculoskeletal:         General: Normal range of motion.      Cervical back: Normal range of motion and neck supple.   Skin:     General: Skin is warm and dry.      Findings: Rash present.   Neurological:      Mental Status: She is alert and oriented to person, place, and time. "   Psychiatric:         Behavior: Behavior normal.         Thought Content: Thought content normal.       Assessment:     1. Chronic obstructive pulmonary disease, unspecified COPD type    2. Fatigue, unspecified type    3. SOB (shortness of breath)    4. Tobacco dependence    5. On long term drug therapy    6. Anxiety    7. Chronic low back pain, unspecified back pain laterality, unspecified whether sciatica present    8. Insomnia, unspecified type    9. Decreased appetite    10. Hypertension, unspecified type    11. Rash        Plan:   Chronic obstructive pulmonary disease, unspecified COPD type  -     X-Ray Chest PA And Lateral; Future; Expected date: 07/10/2023  -     fluticasone-umeclidin-vilanter (TRELEGY ELLIPTA) 100-62.5-25 mcg DsDv; Inhale 1 puff into the lungs once daily.  Dispense: 60 each; Refill: 3    Fatigue, unspecified type  -     TSH; Future; Expected date: 07/10/2023  -     T4, Free; Future; Expected date: 07/10/2023    SOB (shortness of breath)  -     CBC Auto Differential; Future; Expected date: 07/10/2023    Tobacco dependence  -     buPROPion (WELLBUTRIN XL) 150 MG TB24 tablet; Take 1 tablet (150 mg total) by mouth once daily.  Dispense: 30 tablet; Refill: 11    On long term drug therapy  -     Hemoglobin A1C; Future; Expected date: 07/10/2023  -     Comprehensive Metabolic Panel; Future; Expected date: 07/10/2023  -     Lipid Panel; Future; Expected date: 07/10/2023    Anxiety  -     ALPRAZolam (XANAX) 0.25 MG tablet; Take 1 tablet (0.25 mg total) by mouth daily as needed for Anxiety.  Dispense: 30 tablet; Refill: 1  -     busPIRone (BUSPAR) 7.5 MG tablet; Take 1 tablet (7.5 mg total) by mouth 2 (two) times a day.  Dispense: 60 tablet; Refill: 3    Chronic low back pain, unspecified back pain laterality, unspecified whether sciatica present    Insomnia, unspecified type  -     mirtazapine (REMERON) 15 MG tablet; Take 1 tablet (15 mg total) by mouth every evening.  Dispense: 90 tablet; Refill:  1    Decreased appetite  -     mirtazapine (REMERON) 15 MG tablet; Take 1 tablet (15 mg total) by mouth every evening.  Dispense: 90 tablet; Refill: 1    Hypertension, unspecified type  -     hydroCHLOROthiazide (HYDRODIURIL) 25 MG tablet; Take 1 tablet (25 mg total) by mouth once daily.  Dispense: 30 tablet; Refill: 3    Rash  -     mupirocin (BACTROBAN) 2 % ointment; Apply topically 3 (three) times daily.  Dispense: 30 g; Refill: 0      Will not increase Xanax as patient taking chronic pain meds.  Plan to reduce and stop Xanax in the near future.      Trial of BuSpar.      Trial of Wellbutrin.      Smoking cessation encouraged.      Resume Trelegy.      Trial of Bactroban.      Labs pending.      Follow-up in 4 weeks.  Sooner if needed    Medication List with Changes/Refills   New Medications    BUSPIRONE (BUSPAR) 7.5 MG TABLET    Take 1 tablet (7.5 mg total) by mouth 2 (two) times a day.    MUPIROCIN (BACTROBAN) 2 % OINTMENT    Apply topically 3 (three) times daily.   Current Medications    ALBUTEROL (VENTOLIN HFA) 90 MCG/ACTUATION INHALER    Inhale 2 puffs into the lungs every 6 (six) hours as needed for Wheezing. Rescue    CIPROFLOXACIN HCL (CIPRO) 500 MG TABLET    Take 1 tablet (500 mg total) by mouth 2 (two) times daily.    GABAPENTIN (NEURONTIN) 800 MG TABLET    Take 800 mg by mouth 3 (three) times daily.    HYDROCODONE-ACETAMINOPHEN (NORCO) 7.5-325 MG PER TABLET    Take 1 tablet by mouth every 8 (eight) hours as needed for Pain.    POTASSIUM CHLORIDE (KLOR-CON) 10 MEQ TBSR    Take 1 tablet (10 mEq total) by mouth once daily.   Changed and/or Refilled Medications    Modified Medication Previous Medication    ALPRAZOLAM (XANAX) 0.25 MG TABLET ALPRAZolam (XANAX) 0.25 MG tablet       Take 1 tablet (0.25 mg total) by mouth daily as needed for Anxiety.    TAKE 1 TABLET BY MOUTH ONCE DAILY AS NEEDED FOR ANXIETY    BUPROPION (WELLBUTRIN XL) 150 MG TB24 TABLET buPROPion (WELLBUTRIN XL) 150 MG TB24 tablet       Take  1 tablet (150 mg total) by mouth once daily.    Take 1 tablet (150 mg total) by mouth once daily.    FLUTICASONE-UMECLIDIN-VILANTER (TRELEGY ELLIPTA) 100-62.5-25 MCG DSDV fluticasone-umeclidin-vilanter (TRELEGY ELLIPTA) 100-62.5-25 mcg DsDv       Inhale 1 puff into the lungs once daily.    Inhale 1 puff into the lungs once daily.    HYDROCHLOROTHIAZIDE (HYDRODIURIL) 25 MG TABLET hydroCHLOROthiazide (HYDRODIURIL) 25 MG tablet       Take 1 tablet (25 mg total) by mouth once daily.    Take 1 tablet (25 mg total) by mouth once daily.    MIRTAZAPINE (REMERON) 15 MG TABLET mirtazapine (REMERON) 15 MG tablet       Take 1 tablet (15 mg total) by mouth every evening.    Take 1 tablet (15 mg total) by mouth every evening.            Disclaimer: This note may have been prepared using voice recognition software, it may have not been extensively proofed, as such there could be errors within the text such as sound alike errors.

## 2023-10-02 DIAGNOSIS — J44.9 CHRONIC OBSTRUCTIVE PULMONARY DISEASE, UNSPECIFIED COPD TYPE: ICD-10-CM

## 2023-10-02 DIAGNOSIS — J44.9 CHRONIC OBSTRUCTIVE PULMONARY DISEASE, UNSPECIFIED COPD TYPE: Primary | ICD-10-CM

## 2023-10-02 RX ORDER — ALBUTEROL SULFATE 90 UG/1
2 AEROSOL, METERED RESPIRATORY (INHALATION) EVERY 6 HOURS PRN
Qty: 18 G | Refills: 2 | Status: CANCELLED | OUTPATIENT
Start: 2023-10-02 | End: 2024-10-01

## 2023-10-02 RX ORDER — IPRATROPIUM BROMIDE AND ALBUTEROL SULFATE 2.5; .5 MG/3ML; MG/3ML
3 SOLUTION RESPIRATORY (INHALATION) EVERY 6 HOURS PRN
Qty: 300 ML | Refills: 3 | Status: SHIPPED | OUTPATIENT
Start: 2023-10-02 | End: 2024-03-26 | Stop reason: SDUPTHER

## 2023-10-02 RX ORDER — ALBUTEROL SULFATE 90 UG/1
2 AEROSOL, METERED RESPIRATORY (INHALATION) EVERY 6 HOURS PRN
Qty: 18 G | Refills: 2 | Status: SHIPPED | OUTPATIENT
Start: 2023-10-02 | End: 2024-10-01

## 2023-10-08 ENCOUNTER — PATIENT MESSAGE (OUTPATIENT)
Dept: ADMINISTRATIVE | Facility: HOSPITAL | Age: 68
End: 2023-10-08
Payer: MEDICARE

## 2024-02-06 DIAGNOSIS — Z12.11 COLON CANCER SCREENING: ICD-10-CM

## 2024-02-27 ENCOUNTER — OFFICE VISIT (OUTPATIENT)
Dept: FAMILY MEDICINE | Facility: CLINIC | Age: 69
End: 2024-02-27
Payer: MEDICARE

## 2024-02-27 VITALS
OXYGEN SATURATION: 96 % | WEIGHT: 105.81 LBS | DIASTOLIC BLOOD PRESSURE: 72 MMHG | HEIGHT: 62 IN | SYSTOLIC BLOOD PRESSURE: 130 MMHG | BODY MASS INDEX: 19.47 KG/M2 | HEART RATE: 84 BPM

## 2024-02-27 DIAGNOSIS — Z12.31 ENCOUNTER FOR SCREENING MAMMOGRAM FOR MALIGNANT NEOPLASM OF BREAST: ICD-10-CM

## 2024-02-27 DIAGNOSIS — K52.9 COLITIS: ICD-10-CM

## 2024-02-27 DIAGNOSIS — I10 HYPERTENSION, UNSPECIFIED TYPE: Primary | ICD-10-CM

## 2024-02-27 DIAGNOSIS — M81.0 OSTEOPOROSIS, UNSPECIFIED OSTEOPOROSIS TYPE, UNSPECIFIED PATHOLOGICAL FRACTURE PRESENCE: ICD-10-CM

## 2024-02-27 DIAGNOSIS — E78.5 HYPERLIPIDEMIA, UNSPECIFIED HYPERLIPIDEMIA TYPE: ICD-10-CM

## 2024-02-27 DIAGNOSIS — Z79.899 ON LONG TERM DRUG THERAPY: ICD-10-CM

## 2024-02-27 DIAGNOSIS — Z12.11 SCREENING FOR MALIGNANT NEOPLASM OF COLON: ICD-10-CM

## 2024-02-27 DIAGNOSIS — G47.00 INSOMNIA, UNSPECIFIED TYPE: ICD-10-CM

## 2024-02-27 DIAGNOSIS — K29.70 GASTRITIS, PRESENCE OF BLEEDING UNSPECIFIED, UNSPECIFIED CHRONICITY, UNSPECIFIED GASTRITIS TYPE: ICD-10-CM

## 2024-02-27 PROCEDURE — 3288F FALL RISK ASSESSMENT DOCD: CPT | Mod: CPTII,S$GLB,, | Performed by: STUDENT IN AN ORGANIZED HEALTH CARE EDUCATION/TRAINING PROGRAM

## 2024-02-27 PROCEDURE — 1101F PT FALLS ASSESS-DOCD LE1/YR: CPT | Mod: CPTII,S$GLB,, | Performed by: STUDENT IN AN ORGANIZED HEALTH CARE EDUCATION/TRAINING PROGRAM

## 2024-02-27 PROCEDURE — 3008F BODY MASS INDEX DOCD: CPT | Mod: CPTII,S$GLB,, | Performed by: STUDENT IN AN ORGANIZED HEALTH CARE EDUCATION/TRAINING PROGRAM

## 2024-02-27 PROCEDURE — 3078F DIAST BP <80 MM HG: CPT | Mod: CPTII,S$GLB,, | Performed by: STUDENT IN AN ORGANIZED HEALTH CARE EDUCATION/TRAINING PROGRAM

## 2024-02-27 PROCEDURE — 99214 OFFICE O/P EST MOD 30 MIN: CPT | Mod: S$GLB,,, | Performed by: STUDENT IN AN ORGANIZED HEALTH CARE EDUCATION/TRAINING PROGRAM

## 2024-02-27 PROCEDURE — 3075F SYST BP GE 130 - 139MM HG: CPT | Mod: CPTII,S$GLB,, | Performed by: STUDENT IN AN ORGANIZED HEALTH CARE EDUCATION/TRAINING PROGRAM

## 2024-02-27 RX ORDER — ROSUVASTATIN CALCIUM 20 MG/1
20 TABLET, COATED ORAL DAILY
Qty: 90 TABLET | Refills: 3 | Status: SHIPPED | OUTPATIENT
Start: 2024-02-27 | End: 2025-02-26

## 2024-02-27 RX ORDER — CLONIDINE HYDROCHLORIDE 0.1 MG/1
TABLET ORAL
COMMUNITY
Start: 2024-02-20 | End: 2024-04-04 | Stop reason: SDUPTHER

## 2024-02-27 RX ORDER — HYDROCHLOROTHIAZIDE 25 MG/1
25 TABLET ORAL DAILY
Qty: 30 TABLET | Refills: 3 | Status: SHIPPED | OUTPATIENT
Start: 2024-02-27 | End: 2025-02-26

## 2024-02-27 RX ORDER — ESZOPICLONE 1 MG/1
1 TABLET, FILM COATED ORAL NIGHTLY
Qty: 30 TABLET | Refills: 1 | Status: SHIPPED | OUTPATIENT
Start: 2024-02-27 | End: 2024-03-28

## 2024-02-27 NOTE — PROGRESS NOTES
Subjective:      Patient ID: Daniela Ramires is a 68 y.o. female.    Chief Complaint: Hospital Follow Up      HPI:  68-year-old female presents today for hospital follow-up.  Patient states her blood pressure has been mostly well controlled on hydrochlorothiazide for a while now.  She was not able to get her refill recently.  States over the weekend her blood pressure spike to 195/100.  She did have a headache.  Went to the emergency room.  She was given clonidine in a prescription of her medication.  She has been doing much better since she has been back on her meds.  Was also having abdominal pain at the time.  They did a CT scan which showed colitis.  They did recommend she follow-up for a colonoscopy.  She is okay with referral.  Has not been taking her cholesterol medication.  Needs refill of this.  Not UTD on mammogram or DEXA.    Past Medical History:   Diagnosis Date    Cervical radiculopathy     COPD (chronic obstructive pulmonary disease)     Hypertension      Past Surgical History:   Procedure Laterality Date    BACK SURGERY       SECTION      EPIDURAL STEROID INJECTION INTO CERVICAL SPINE N/A 2023    Procedure: INJECTION, STEROID, SPINE, CERVICAL, EPIDURAL (ILESI C7-T1);  Surgeon: Shahram Bonilla MD;  Location: [facility];  Service: Pain Management;  Laterality: N/A;    HYSTERECTOMY      NECK SURGERY       Family History   Problem Relation Age of Onset    No Known Problems Mother     No Known Problems Father      Social History     Socioeconomic History    Marital status:    Tobacco Use    Smoking status: Every Day     Current packs/day: 1.00     Types: Cigarettes     Passive exposure: Past    Smokeless tobacco: Never   Substance and Sexual Activity    Alcohol use: Never    Drug use: Never     Review of patient's allergies indicates:  No Known Allergies    Review of Systems   Constitutional:  Negative for activity change, appetite change, fatigue, fever and unexpected weight change.  "  HENT:  Negative for congestion, postnasal drip, rhinorrhea and sinus pain.    Respiratory:  Negative for cough and shortness of breath.    Cardiovascular:  Negative for chest pain and palpitations.   Gastrointestinal:  Negative for abdominal pain, nausea and vomiting.   Genitourinary:  Negative for difficulty urinating.   Musculoskeletal:  Negative for arthralgias and myalgias.   Neurological:  Negative for dizziness and headaches.   Psychiatric/Behavioral:  Negative for decreased concentration and dysphoric mood. The patient is not nervous/anxious.        Objective:       /72 (BP Location: Left arm, Patient Position: Sitting, BP Method: Medium (Manual))   Pulse 84   Ht 5' 2" (1.575 m)   Wt 48 kg (105 lb 12.8 oz)   SpO2 96%   BMI 19.35 kg/m²   Physical Exam  Vitals and nursing note reviewed.   Constitutional:       Appearance: Normal appearance. She is well-developed.   HENT:      Head: Normocephalic and atraumatic.   Eyes:      Extraocular Movements: Extraocular movements intact.      Conjunctiva/sclera: Conjunctivae normal.      Pupils: Pupils are equal, round, and reactive to light.   Cardiovascular:      Rate and Rhythm: Normal rate and regular rhythm.      Heart sounds: Normal heart sounds.   Pulmonary:      Effort: Pulmonary effort is normal.      Breath sounds: Normal breath sounds.   Abdominal:      Palpations: Abdomen is soft.   Musculoskeletal:         General: Normal range of motion.      Cervical back: Normal range of motion and neck supple.   Skin:     General: Skin is warm and dry.   Neurological:      General: No focal deficit present.      Mental Status: She is alert and oriented to person, place, and time.   Psychiatric:         Mood and Affect: Mood normal.         Assessment:     1. Hypertension, unspecified type    2. Hyperlipidemia, unspecified hyperlipidemia type    3. Colitis    4. Gastritis, presence of bleeding unspecified, unspecified chronicity, unspecified gastritis type  "   5. Insomnia, unspecified type    6. Screening for malignant neoplasm of colon    7. Encounter for screening mammogram for malignant neoplasm of breast    8. Osteoporosis, unspecified osteoporosis type, unspecified pathological fracture presence    9. On long term drug therapy        Plan:   Hypertension, unspecified type  -     hydroCHLOROthiazide (HYDRODIURIL) 25 MG tablet; Take 1 tablet (25 mg total) by mouth once daily.  Dispense: 30 tablet; Refill: 3  -     CBC Auto Differential; Future; Expected date: 02/27/2024  -     Comprehensive Metabolic Panel; Future; Expected date: 02/27/2024    Hyperlipidemia, unspecified hyperlipidemia type  -     rosuvastatin (CRESTOR) 20 MG tablet; Take 1 tablet (20 mg total) by mouth once daily.  Dispense: 90 tablet; Refill: 3    Colitis  -     Ambulatory referral/consult to Gastroenterology; Future; Expected date: 03/05/2024    Gastritis, presence of bleeding unspecified, unspecified chronicity, unspecified gastritis type  -     Ambulatory referral/consult to Gastroenterology; Future; Expected date: 03/05/2024    Insomnia, unspecified type  -     eszopiclone (LUNESTA) 1 MG Tab; Take 1 tablet (1 mg total) by mouth nightly.  Dispense: 30 tablet; Refill: 1    Screening for malignant neoplasm of colon  -     Ambulatory referral/consult to Gastroenterology; Future; Expected date: 03/05/2024    Encounter for screening mammogram for malignant neoplasm of breast  -     Mammo Digital Screening Bilat; Future; Expected date: 02/27/2024    Osteoporosis, unspecified osteoporosis type, unspecified pathological fracture presence  -     DXA Body Composition; Future; Expected date: 02/27/2024    On long term drug therapy  -     CBC Auto Differential; Future; Expected date: 02/27/2024  -     Comprehensive Metabolic Panel; Future; Expected date: 02/27/2024  -     Hemoglobin A1C; Future; Expected date: 02/27/2024  -     Lipid Panel; Future; Expected date: 02/27/2024  -     TSH; Future; Expected  date: 02/27/2024  -     T4, Free; Future; Expected date: 02/27/2024      Reviewed hospital records.      BP okay in clinic.  Will resend hydrochlorothiazide.      LA  reviewed.  Will retry Lunesta.      Labs pending.      Mammogram and DEXA ordered.      GI referral pending for colonoscopy screening.      Resume statin.      ER precautions provided.    RTC in 1 month.  Sooner if needed.  Medication List with Changes/Refills   New Medications    ESZOPICLONE (LUNESTA) 1 MG TAB    Take 1 tablet (1 mg total) by mouth nightly.    ROSUVASTATIN (CRESTOR) 20 MG TABLET    Take 1 tablet (20 mg total) by mouth once daily.   Current Medications    ALBUTEROL (VENTOLIN HFA) 90 MCG/ACTUATION INHALER    Inhale 2 puffs into the lungs every 6 (six) hours as needed for Wheezing. Rescue    ALBUTEROL-IPRATROPIUM (DUO-NEB) 2.5 MG-0.5 MG/3 ML NEBULIZER SOLUTION    Take 3 mLs by nebulization every 6 (six) hours as needed for Wheezing. Rescue    ALPRAZOLAM (XANAX) 0.25 MG TABLET    Take 1 tablet (0.25 mg total) by mouth daily as needed for Anxiety.    BUPROPION (WELLBUTRIN XL) 150 MG TB24 TABLET    Take 1 tablet (150 mg total) by mouth once daily.    BUSPIRONE (BUSPAR) 7.5 MG TABLET    Take 1 tablet (7.5 mg total) by mouth 2 (two) times a day.    CIPROFLOXACIN HCL (CIPRO) 500 MG TABLET    Take 1 tablet (500 mg total) by mouth 2 (two) times daily.    CLONIDINE (CATAPRES) 0.1 MG TABLET        FLUTICASONE-UMECLIDIN-VILANTER (TRELEGY ELLIPTA) 100-62.5-25 MCG DSDV    Inhale 1 puff into the lungs once daily.    GABAPENTIN (NEURONTIN) 800 MG TABLET    Take 800 mg by mouth 3 (three) times daily.    HYDROCODONE-ACETAMINOPHEN (NORCO) 7.5-325 MG PER TABLET    Take 1 tablet by mouth every 8 (eight) hours as needed for Pain.    MIRTAZAPINE (REMERON) 15 MG TABLET    Take 1 tablet (15 mg total) by mouth every evening.    MUPIROCIN (BACTROBAN) 2 % OINTMENT    Apply topically 3 (three) times daily.    POTASSIUM CHLORIDE (KLOR-CON) 10 MEQ TBSR    Take 1  tablet (10 mEq total) by mouth once daily.   Changed and/or Refilled Medications    Modified Medication Previous Medication    HYDROCHLOROTHIAZIDE (HYDRODIURIL) 25 MG TABLET hydroCHLOROthiazide (HYDRODIURIL) 25 MG tablet       Take 1 tablet (25 mg total) by mouth once daily.    Take 1 tablet (25 mg total) by mouth once daily.              Disclaimer: This note may have been prepared using voice recognition software, it may have not been extensively proofed, as such there could be errors within the text such as sound alike errors.

## 2024-02-28 ENCOUNTER — TELEPHONE (OUTPATIENT)
Dept: GASTROENTEROLOGY | Facility: CLINIC | Age: 69
End: 2024-02-28
Payer: MEDICARE

## 2024-02-28 LAB
ABS NRBC COUNT: 0 X 10 3/UL (ref 0–0.01)
ABSOLUTE BASOPHIL: 0.12 X 10 3/UL (ref 0–0.22)
ABSOLUTE EOSINOPHIL: 0.22 X 10 3/UL (ref 0.04–0.54)
ABSOLUTE IMMATURE GRAN: 0.04 X 10 3/UL (ref 0–0.04)
ABSOLUTE LYMPHOCYTE: 1.87 X 10 3/UL (ref 0.86–4.75)
ABSOLUTE MONOCYTE: 1.19 X 10 3/UL (ref 0.22–1.08)
ALBUMIN SERPL-MCNC: 4.3 G/DL (ref 3.5–5.2)
ALBUMIN/GLOB SERPL ELPH: 1.9 {RATIO} (ref 1–2.7)
ALP ISOS SERPL LEV INH-CCNC: 90 U/L (ref 35–105)
ALT (SGPT): 7 U/L (ref 0–33)
ANION GAP SERPL CALC-SCNC: 14 MMOL/L (ref 8–17)
AST SERPL-CCNC: 13 U/L (ref 0–32)
BASOPHILS NFR BLD: 0.9 % (ref 0.2–1.2)
BILIRUBIN, TOTAL: 0.17 MG/DL (ref 0–1.2)
BUN/CREAT SERPL: 16.5 (ref 6–20)
CALCIUM SERPL-MCNC: 8.8 MG/DL (ref 8.6–10.2)
CARBON DIOXIDE, CO2: 26 MMOL/L (ref 22–29)
CHLORIDE: 95 MMOL/L (ref 98–107)
CHOLEST SERPL-MSCNC: 151 MG/DL (ref 100–200)
CREAT SERPL-MCNC: 0.71 MG/DL (ref 0.5–0.9)
EOSINOPHIL NFR BLD: 1.6 % (ref 0.7–7)
ESTIMATED AVERAGE GLUCOSE: 114 MG/DL
GFR ESTIMATION: 93.72 ML/MIN/1.73M2
GLOBULIN: 2.3 G/DL (ref 1.5–4.5)
GLUCOSE: 97 MG/DL (ref 82–115)
HBA1C MFR BLD: 5.6 % (ref 4–6)
HCT VFR BLD AUTO: 36.6 % (ref 37–47)
HDLC SERPL-MCNC: 53 MG/DL
HGB BLD-MCNC: 12.4 G/DL (ref 12–16)
IMMATURE GRANULOCYTES: 0.3 % (ref 0–0.5)
LDL/HDL RATIO: 1.4 (ref 1–3)
LDLC SERPL CALC-MCNC: 73.2 MG/DL (ref 0–100)
LYMPHOCYTES NFR BLD: 13.7 % (ref 19.3–53.1)
MCH RBC QN AUTO: 33.1 PG (ref 27–32)
MCHC RBC AUTO-ENTMCNC: 33.9 G/DL (ref 32–36)
MCV RBC AUTO: 97.6 FL (ref 82–100)
MONOCYTES NFR BLD: 8.7 % (ref 4.7–12.5)
NEUTROPHILS # BLD AUTO: 10.2 X 10 3/UL (ref 2.15–7.56)
NEUTROPHILS NFR BLD: 74.8 % (ref 34–71.1)
NUCLEATED RED BLOOD CELLS: 0 /100 WBC (ref 0–0.2)
PLATELET # BLD AUTO: 376 X 10 3/UL (ref 135–400)
POTASSIUM: 4.3 MMOL/L (ref 3.5–5.1)
PROT SNV-MCNC: 6.6 G/DL (ref 6.4–8.3)
RBC # BLD AUTO: 3.75 X 10 6/UL (ref 4.2–5.4)
RDW-SD: 56.5 FL (ref 37–54)
SODIUM: 135 MMOL/L (ref 136–145)
T4, FREE: 1.32 NG/DL (ref 0.93–1.7)
TRIGL SERPL-MCNC: 124 MG/DL (ref 0–150)
TSH SERPL DL<=0.005 MIU/L-ACNC: 2.65 UIU/ML (ref 0.27–4.2)
UREA NITROGEN (BUN): 11.7 MG/DL (ref 8–23)
WBC # BLD: 13.64 X 10 3/UL (ref 4.3–10.8)

## 2024-03-26 DIAGNOSIS — J44.9 CHRONIC OBSTRUCTIVE PULMONARY DISEASE, UNSPECIFIED COPD TYPE: ICD-10-CM

## 2024-03-26 RX ORDER — IPRATROPIUM BROMIDE AND ALBUTEROL SULFATE 2.5; .5 MG/3ML; MG/3ML
3 SOLUTION RESPIRATORY (INHALATION) EVERY 6 HOURS PRN
Qty: 300 ML | Refills: 3 | Status: SHIPPED | OUTPATIENT
Start: 2024-03-26 | End: 2025-03-26

## 2024-04-04 RX ORDER — CLONIDINE HYDROCHLORIDE 0.1 MG/1
TABLET ORAL
Qty: 30 TABLET | Refills: 3 | Status: SHIPPED | OUTPATIENT
Start: 2024-04-04

## 2024-09-05 ENCOUNTER — PATIENT MESSAGE (OUTPATIENT)
Dept: PRIMARY CARE CLINIC | Facility: CLINIC | Age: 69
End: 2024-09-05
Payer: MEDICARE

## 2024-12-22 DIAGNOSIS — J44.9 CHRONIC OBSTRUCTIVE PULMONARY DISEASE, UNSPECIFIED COPD TYPE: ICD-10-CM

## 2024-12-23 RX ORDER — IPRATROPIUM BROMIDE AND ALBUTEROL SULFATE 2.5; .5 MG/3ML; MG/3ML
SOLUTION RESPIRATORY (INHALATION)
Qty: 270 ML | Refills: 1 | Status: SHIPPED | OUTPATIENT
Start: 2024-12-23

## 2025-03-06 ENCOUNTER — PATIENT MESSAGE (OUTPATIENT)
Dept: ADMINISTRATIVE | Facility: HOSPITAL | Age: 70
End: 2025-03-06
Payer: MEDICARE

## 2025-05-09 ENCOUNTER — PATIENT MESSAGE (OUTPATIENT)
Facility: CLINIC | Age: 70
End: 2025-05-09
Payer: MEDICARE

## 2025-05-14 DIAGNOSIS — J44.9 CHRONIC OBSTRUCTIVE PULMONARY DISEASE, UNSPECIFIED COPD TYPE: ICD-10-CM

## 2025-05-14 RX ORDER — IPRATROPIUM BROMIDE AND ALBUTEROL SULFATE 2.5; .5 MG/3ML; MG/3ML
SOLUTION RESPIRATORY (INHALATION)
Qty: 180 ML | Refills: 0 | Status: SHIPPED | OUTPATIENT
Start: 2025-05-14

## 2025-07-15 ENCOUNTER — OFFICE VISIT (OUTPATIENT)
Dept: FAMILY MEDICINE | Facility: CLINIC | Age: 70
End: 2025-07-15
Payer: MEDICARE

## 2025-07-15 VITALS
DIASTOLIC BLOOD PRESSURE: 90 MMHG | WEIGHT: 86 LBS | TEMPERATURE: 99 F | SYSTOLIC BLOOD PRESSURE: 150 MMHG | BODY MASS INDEX: 15.83 KG/M2 | OXYGEN SATURATION: 96 % | HEART RATE: 88 BPM | HEIGHT: 62 IN | RESPIRATION RATE: 18 BRPM

## 2025-07-15 DIAGNOSIS — G47.00 INSOMNIA, UNSPECIFIED TYPE: ICD-10-CM

## 2025-07-15 DIAGNOSIS — R63.4 WEIGHT LOSS: ICD-10-CM

## 2025-07-15 DIAGNOSIS — J44.9 CHRONIC OBSTRUCTIVE PULMONARY DISEASE, UNSPECIFIED COPD TYPE: Primary | ICD-10-CM

## 2025-07-15 DIAGNOSIS — I10 HYPERTENSION, UNSPECIFIED TYPE: ICD-10-CM

## 2025-07-15 DIAGNOSIS — F17.200 TOBACCO DEPENDENCE: ICD-10-CM

## 2025-07-15 DIAGNOSIS — D69.1 THROMBOCYTE DISORDER: ICD-10-CM

## 2025-07-15 DIAGNOSIS — Z79.899 ON LONG TERM DRUG THERAPY: ICD-10-CM

## 2025-07-15 DIAGNOSIS — Z12.31 ENCOUNTER FOR SCREENING MAMMOGRAM FOR MALIGNANT NEOPLASM OF BREAST: ICD-10-CM

## 2025-07-15 PROCEDURE — 1159F MED LIST DOCD IN RCRD: CPT | Mod: CPTII,S$GLB,, | Performed by: STUDENT IN AN ORGANIZED HEALTH CARE EDUCATION/TRAINING PROGRAM

## 2025-07-15 PROCEDURE — 3008F BODY MASS INDEX DOCD: CPT | Mod: CPTII,S$GLB,, | Performed by: STUDENT IN AN ORGANIZED HEALTH CARE EDUCATION/TRAINING PROGRAM

## 2025-07-15 PROCEDURE — 99214 OFFICE O/P EST MOD 30 MIN: CPT | Mod: S$GLB,,, | Performed by: STUDENT IN AN ORGANIZED HEALTH CARE EDUCATION/TRAINING PROGRAM

## 2025-07-15 PROCEDURE — 3077F SYST BP >= 140 MM HG: CPT | Mod: CPTII,S$GLB,, | Performed by: STUDENT IN AN ORGANIZED HEALTH CARE EDUCATION/TRAINING PROGRAM

## 2025-07-15 PROCEDURE — 3080F DIAST BP >= 90 MM HG: CPT | Mod: CPTII,S$GLB,, | Performed by: STUDENT IN AN ORGANIZED HEALTH CARE EDUCATION/TRAINING PROGRAM

## 2025-07-15 RX ORDER — IPRATROPIUM BROMIDE AND ALBUTEROL SULFATE 2.5; .5 MG/3ML; MG/3ML
SOLUTION RESPIRATORY (INHALATION)
Qty: 180 ML | Refills: 2 | Status: SHIPPED | OUTPATIENT
Start: 2025-07-15

## 2025-07-15 RX ORDER — ESZOPICLONE 1 MG/1
1 TABLET, FILM COATED ORAL NIGHTLY
Qty: 30 TABLET | Refills: 1 | Status: SHIPPED | OUTPATIENT
Start: 2025-07-15 | End: 2025-08-14

## 2025-07-16 NOTE — PROGRESS NOTES
Subjective:      Patient ID: Daniela Ramires is a 69 y.o. female.    Chief Complaint: Follow-up (Nerve pain from ruptured disc, dr arthur tx, gabapentin refill/COPD exacerbation, needs refill for neb)      HPI:  69-year-old female presents today with multiple complaints.  Patient states she has been dealing with a lot of back pain recently.  She is following with neurosurgery in [place].  They do have her on gabapentin.  She is wanting to increase her dosage.  She is hoping to get her surgery in the next couple of months.  She does need a refill on her nebulizers.  COPD has been mostly well controlled.  She has noticed some weight loss.  Denies any decrease in appetite.  Denies any abdominal pain or fevers.  Overall patient has been feeling well.  She is ready to get her back surgery.  She is still smoking.  Has a bone density scan coming up through her neurosurgeon's office.  Blood pressure elevated today.  Patient attributes this to her pain level.  Reports taking her medications as prescribed.    Past Medical History:   Diagnosis Date    Cervical radiculopathy     COPD (chronic obstructive pulmonary disease)     Hypertension      Past Surgical History:   Procedure Laterality Date    BACK SURGERY       SECTION      EPIDURAL STEROID INJECTION INTO CERVICAL SPINE N/A 2023    Procedure: INJECTION, STEROID, SPINE, CERVICAL, EPIDURAL (ILESI C7-T1);  Surgeon: Shahram Bonilla MD;  Location: [facility];  Service: Pain Management;  Laterality: N/A;    HYSTERECTOMY      NECK SURGERY       Family History   Problem Relation Name Age of Onset    No Known Problems Mother      No Known Problems Father       Social History[1]  Review of patient's allergies indicates:  No Known Allergies    Review of Systems   Constitutional:  Positive for unexpected weight change. Negative for activity change, appetite change, fatigue and fever.   HENT:  Negative for congestion, postnasal drip, rhinorrhea and sinus pain.   "  Respiratory:  Negative for cough and shortness of breath.    Cardiovascular:  Negative for chest pain and palpitations.   Gastrointestinal:  Negative for abdominal pain, nausea and vomiting.   Genitourinary:  Negative for difficulty urinating.   Musculoskeletal:  Positive for back pain. Negative for arthralgias and myalgias.   Neurological:  Negative for dizziness and headaches.   Psychiatric/Behavioral:  Negative for confusion, decreased concentration and dysphoric mood. The patient is not nervous/anxious.        Objective:       BP (!) 150/90 (BP Location: Left arm, Patient Position: Sitting)   Pulse 88   Temp 98.6 °F (37 °C) (Oral)   Resp 18   Ht 5' 2" (1.575 m)   Wt 39 kg (86 lb)   SpO2 96%   BMI 15.73 kg/m²   Physical Exam  Vitals and nursing note reviewed.   Constitutional:       Appearance: Normal appearance. She is well-developed.   HENT:      Head: Normocephalic and atraumatic.   Eyes:      Extraocular Movements: Extraocular movements intact.      Conjunctiva/sclera: Conjunctivae normal.      Pupils: Pupils are equal, round, and reactive to light.   Cardiovascular:      Rate and Rhythm: Normal rate and regular rhythm.      Heart sounds: Normal heart sounds.   Pulmonary:      Effort: Pulmonary effort is normal.      Breath sounds: Normal breath sounds. No wheezing or rales.   Abdominal:      Palpations: Abdomen is soft.   Musculoskeletal:         General: Normal range of motion.      Cervical back: Normal range of motion and neck supple.   Skin:     General: Skin is warm and dry.   Neurological:      General: No focal deficit present.      Mental Status: She is alert and oriented to person, place, and time.   Psychiatric:         Mood and Affect: Mood normal.         Assessment:     1. Chronic obstructive pulmonary disease, unspecified COPD type    2. Hypertension, unspecified type    3. Thrombocyte disorder    4. Weight loss    5. Insomnia, unspecified type    6. Encounter for screening mammogram " for malignant neoplasm of breast    7. On long term drug therapy    8. Tobacco dependence        Plan:   Chronic obstructive pulmonary disease, unspecified COPD type  -     albuterol-ipratropium (DUO-NEB) 2.5 mg-0.5 mg/3 mL nebulizer solution; RescueINHALE THE CONTENTS OF 1 VIAL BY MOUTH VIA NEBULIZATION BY MOUTH EVERY 6 HOURS AS NEEDED FOR WHEEZING  Dispense: 180 mL; Refill: 2  -     X-Ray Chest PA And Lateral; Future; Expected date: 07/15/2025    Hypertension, unspecified type    Thrombocyte disorder    Weight loss  -     CBC Auto Differential; Future; Expected date: 07/15/2025  -     Comprehensive Metabolic Panel; Future; Expected date: 07/15/2025  -     TSH; Future; Expected date: 07/15/2025  -     T4, Free; Future; Expected date: 07/15/2025  -     Sedimentation rate; Future; Expected date: 07/15/2025  -     C-Reactive Protein; Future; Expected date: 07/15/2025  -     Urinalysis; Future; Expected date: 07/15/2025  -     HIV 1/2 Ag/Ab (4th Gen); Future; Expected date: 07/15/2025  -     Hepatitis Panel, Acute; Future; Expected date: 07/15/2025  -     Hemoglobin A1C; Future; Expected date: 07/15/2025    Insomnia, unspecified type  -     eszopiclone (LUNESTA) 1 MG Tab; Take 1 tablet (1 mg total) by mouth nightly.  Dispense: 30 tablet; Refill: 1    Encounter for screening mammogram for malignant neoplasm of breast  -     Mammo Digital Screening Bilat; Future; Expected date: 07/15/2025    On long term drug therapy  -     CBC Auto Differential; Future; Expected date: 07/15/2025  -     Comprehensive Metabolic Panel; Future; Expected date: 07/15/2025  -     TSH; Future; Expected date: 07/15/2025  -     T4, Free; Future; Expected date: 07/15/2025  -     Hemoglobin A1C; Future; Expected date: 07/15/2025    Tobacco dependence      Meds refilled.  Continue BP medications as prescribed.      BP elevated in clinic.  Reports normals at home.  Monitor at home.  Keep log to review at follow-up.      Weight loss noted.  Labs  pending.      LA  reviewed.  Will retry Lunesta.  Side effects discussed.      Mammogram ordered.  Has DEXA scan coming up through neurosurgery.      Discussed colon cancer screening and chest CT.  Patient wishes to wait at this time.    RTC in 2-3 weeks.  Sooner if needed.  Medication List with Changes/Refills   New Medications    ESZOPICLONE (LUNESTA) 1 MG TAB    Take 1 tablet (1 mg total) by mouth nightly.   Current Medications    ALBUTEROL (VENTOLIN HFA) 90 MCG/ACTUATION INHALER    Inhale 2 puffs into the lungs every 6 (six) hours as needed for Wheezing. Rescue    ALPRAZOLAM (XANAX) 0.25 MG TABLET    Take 1 tablet (0.25 mg total) by mouth daily as needed for Anxiety.    BUPROPION (WELLBUTRIN XL) 150 MG TB24 TABLET    Take 1 tablet (150 mg total) by mouth once daily.    BUSPIRONE (BUSPAR) 7.5 MG TABLET    Take 1 tablet (7.5 mg total) by mouth 2 (two) times a day.    CIPROFLOXACIN HCL (CIPRO) 500 MG TABLET    Take 1 tablet (500 mg total) by mouth 2 (two) times daily.    CLONIDINE (CATAPRES) 0.1 MG TABLET    Take one tablet by mouth once per day    FLUTICASONE-UMECLIDIN-VILANTER (TRELEGY ELLIPTA) 100-62.5-25 MCG DSDV    Inhale 1 puff into the lungs once daily.    GABAPENTIN (NEURONTIN) 800 MG TABLET    Take 800 mg by mouth 3 (three) times daily.    HYDROCHLOROTHIAZIDE (HYDRODIURIL) 25 MG TABLET    Take 1 tablet (25 mg total) by mouth once daily.    HYDROCODONE-ACETAMINOPHEN (NORCO) 7.5-325 MG PER TABLET    Take 1 tablet by mouth every 8 (eight) hours as needed for Pain.    MIRTAZAPINE (REMERON) 15 MG TABLET    Take 1 tablet (15 mg total) by mouth every evening.    MUPIROCIN (BACTROBAN) 2 % OINTMENT    Apply topically 3 (three) times daily.    POTASSIUM CHLORIDE (KLOR-CON) 10 MEQ TBSR    Take 1 tablet (10 mEq total) by mouth once daily.    ROSUVASTATIN (CRESTOR) 20 MG TABLET    Take 1 tablet (20 mg total) by mouth once daily.   Changed and/or Refilled Medications    Modified Medication Previous Medication     ALBUTEROL-IPRATROPIUM (DUO-NEB) 2.5 MG-0.5 MG/3 ML NEBULIZER SOLUTION albuterol-ipratropium (DUO-NEB) 2.5 mg-0.5 mg/3 mL nebulizer solution       RescueINHALE THE CONTENTS OF 1 VIAL BY MOUTH VIA NEBULIZATION BY MOUTH EVERY 6 HOURS AS NEEDED FOR WHEEZING    INHALE THE CONTENTS OF 1 VIAL BY MOUTH VIA NEBULIZATION BY MOUTH EVERY 6 HOURS AS NEEDED FOR WHEEZING              Disclaimer: This note may have been prepared using voice recognition software, it may have not been extensively proofed, as such there could be errors within the text such as sound alike errors.          [1]   Social History  Socioeconomic History    Marital status:    Tobacco Use    Smoking status: Every Day     Current packs/day: 1.00     Types: Cigarettes     Passive exposure: Past    Smokeless tobacco: Never   Substance and Sexual Activity    Alcohol use: Never    Drug use: Never     Social Drivers of Health     Financial Resource Strain: Patient Declined (3/9/2025)    Received from [facility]    Overall Financial Resource Strain (CARDIA)     Difficulty of Paying Living Expenses: Patient declined   Food Insecurity: Patient Declined (3/9/2025)    Received from [facility]    Hunger Vital Sign     Worried About Running Out of Food in the Last Year: Patient declined     Ran Out of Food in the Last Year: Patient declined   Transportation Needs: Patient Declined (3/9/2025)    Received from [facility]    PRAPARE - Transportation     In the past 12 months, has lack of transportation kept you from medical appointments or from getting medications?: Patient declined   Housing Stability: Patient Declined (3/9/2025)    Received from [facility]    Housing Stability Vital Sign     At any time in the past 12 months, were you homeless or living in a shelter (including now)?: Patient declined

## 2025-08-29 ENCOUNTER — PATIENT MESSAGE (OUTPATIENT)
Dept: ADMINISTRATIVE | Facility: HOSPITAL | Age: 70
End: 2025-08-29
Payer: MEDICARE

## (undated) DEVICE — APPLICATOR CHLORAPREP ORN 26ML

## (undated) DEVICE — SYR EPILOR LUER-LOK LOR 7ML

## (undated) DEVICE — GLOVE PROTEXIS HYDROGEL SZ7.5

## (undated) DEVICE — SYR DISP LL 5CC

## (undated) DEVICE — SET IV EXT GENERAL 4.9ML 30IN

## (undated) DEVICE — CONTRAST ISOVUE M 300 15ML VIL

## (undated) DEVICE — GLOVE PROTEXIS HYDROGEL SZ6.5

## (undated) DEVICE — TRAY SINGLE DOSE EPIDURAL

## (undated) DEVICE — TOWEL OR BLUE STRL 16X26 4/PK